# Patient Record
Sex: FEMALE | Race: WHITE | NOT HISPANIC OR LATINO | Employment: UNEMPLOYED | URBAN - METROPOLITAN AREA
[De-identification: names, ages, dates, MRNs, and addresses within clinical notes are randomized per-mention and may not be internally consistent; named-entity substitution may affect disease eponyms.]

---

## 2018-01-04 ENCOUNTER — HOSPITAL ENCOUNTER (OUTPATIENT)
Facility: CLINIC | Age: 38
End: 2018-01-04
Attending: INTERNAL MEDICINE | Admitting: INTERNAL MEDICINE

## 2018-01-04 ENCOUNTER — APPOINTMENT (OUTPATIENT)
Dept: GENERAL RADIOLOGY | Facility: CLINIC | Age: 38
End: 2018-01-04
Attending: EMERGENCY MEDICINE
Payer: COMMERCIAL

## 2018-01-04 ENCOUNTER — HOSPITAL ENCOUNTER (EMERGENCY)
Facility: CLINIC | Age: 38
Discharge: HOME | End: 2018-01-04
Attending: EMERGENCY MEDICINE | Admitting: INTERNAL MEDICINE
Payer: COMMERCIAL

## 2018-01-04 ENCOUNTER — SURGERY (OUTPATIENT)
Age: 38
End: 2018-01-04

## 2018-01-04 VITALS
TEMPERATURE: 98.7 F | DIASTOLIC BLOOD PRESSURE: 55 MMHG | BODY MASS INDEX: 22.07 KG/M2 | OXYGEN SATURATION: 100 % | RESPIRATION RATE: 16 BRPM | HEIGHT: 69 IN | HEART RATE: 78 BPM | SYSTOLIC BLOOD PRESSURE: 97 MMHG | WEIGHT: 149 LBS

## 2018-01-04 DIAGNOSIS — T18.9XXA SWALLOWED FOREIGN BODY, INITIAL ENCOUNTER: ICD-10-CM

## 2018-01-04 LAB — UPPER GI ENDOSCOPY: NORMAL

## 2018-01-04 PROCEDURE — G0500 MOD SEDAT ENDO SERVICE >5YRS: HCPCS | Performed by: INTERNAL MEDICINE

## 2018-01-04 PROCEDURE — 43235 EGD DIAGNOSTIC BRUSH WASH: CPT | Performed by: INTERNAL MEDICINE

## 2018-01-04 PROCEDURE — 71046 X-RAY EXAM CHEST 2 VIEWS: CPT

## 2018-01-04 PROCEDURE — 99285 EMERGENCY DEPT VISIT HI MDM: CPT | Mod: Z6 | Performed by: EMERGENCY MEDICINE

## 2018-01-04 PROCEDURE — 99285 EMERGENCY DEPT VISIT HI MDM: CPT | Mod: 25

## 2018-01-04 PROCEDURE — 25000132 ZZH RX MED GY IP 250 OP 250 PS 637: Performed by: INTERNAL MEDICINE

## 2018-01-04 PROCEDURE — 99153 MOD SED SAME PHYS/QHP EA: CPT | Performed by: INTERNAL MEDICINE

## 2018-01-04 PROCEDURE — 25000128 H RX IP 250 OP 636: Performed by: INTERNAL MEDICINE

## 2018-01-04 PROCEDURE — 25000125 ZZHC RX 250: Performed by: INTERNAL MEDICINE

## 2018-01-04 PROCEDURE — 74018 RADEX ABDOMEN 1 VIEW: CPT

## 2018-01-04 RX ORDER — FENTANYL CITRATE 50 UG/ML
INJECTION, SOLUTION INTRAMUSCULAR; INTRAVENOUS PRN
Status: DISCONTINUED | OUTPATIENT
Start: 2018-01-04 | End: 2018-01-04 | Stop reason: HOSPADM

## 2018-01-04 RX ORDER — SIMETHICONE
LIQUID (ML) MISCELLANEOUS PRN
Status: DISCONTINUED | OUTPATIENT
Start: 2018-01-04 | End: 2018-01-04 | Stop reason: HOSPADM

## 2018-01-04 RX ORDER — DIPHENHYDRAMINE HYDROCHLORIDE 50 MG/ML
INJECTION INTRAMUSCULAR; INTRAVENOUS PRN
Status: DISCONTINUED | OUTPATIENT
Start: 2018-01-04 | End: 2018-01-04 | Stop reason: HOSPADM

## 2018-01-04 RX ADMIN — DIPHENHYDRAMINE HYDROCHLORIDE 50 MG: 50 INJECTION, SOLUTION INTRAMUSCULAR; INTRAVENOUS at 13:48

## 2018-01-04 RX ADMIN — MIDAZOLAM 1 MG: 1 INJECTION INTRAMUSCULAR; INTRAVENOUS at 13:53

## 2018-01-04 RX ADMIN — FENTANYL CITRATE 50 MCG: 50 INJECTION, SOLUTION INTRAMUSCULAR; INTRAVENOUS at 13:54

## 2018-01-04 RX ADMIN — MIDAZOLAM 1 MG: 1 INJECTION INTRAMUSCULAR; INTRAVENOUS at 14:30

## 2018-01-04 RX ADMIN — FENTANYL CITRATE 50 MCG: 50 INJECTION, SOLUTION INTRAMUSCULAR; INTRAVENOUS at 14:32

## 2018-01-04 RX ADMIN — BENZOCAINE 1 SPRAY: 220 SPRAY, METERED PERIODONTAL at 13:49

## 2018-01-04 RX ADMIN — Medication 2 ML: at 13:47

## 2018-01-04 RX ADMIN — MIDAZOLAM 1 MG: 1 INJECTION INTRAMUSCULAR; INTRAVENOUS at 14:10

## 2018-01-04 RX ADMIN — MIDAZOLAM 2 MG: 1 INJECTION INTRAMUSCULAR; INTRAVENOUS at 13:52

## 2018-01-04 RX ADMIN — FENTANYL CITRATE 50 MCG: 50 INJECTION, SOLUTION INTRAMUSCULAR; INTRAVENOUS at 14:14

## 2018-01-04 RX ADMIN — MIDAZOLAM 1 MG: 1 INJECTION INTRAMUSCULAR; INTRAVENOUS at 14:12

## 2018-01-04 RX ADMIN — FENTANYL CITRATE 50 MCG: 50 INJECTION, SOLUTION INTRAMUSCULAR; INTRAVENOUS at 13:50

## 2018-01-04 ASSESSMENT — ENCOUNTER SYMPTOMS
VOMITING: 0
ABDOMINAL PAIN: 0
NECK PAIN: 0
COUGH: 0
FEVER: 0
SHORTNESS OF BREATH: 0
NAUSEA: 0
CHEST TIGHTNESS: 0
NECK STIFFNESS: 0
CHILLS: 0

## 2018-01-04 NOTE — ED AVS SNAPSHOT
[facility], [place], Emergency Department    [address] [place] [address]    Phone:  [phone]                                       Vane Galicia   MRN: [id number]    Department:  [facility], Emergency Department   Date of Visit:  1/4/2018           After Visit Summary Signature Page     I have received my discharge instructions, and my questions have been answered. I have discussed any challenges I see with this plan with the nurse or doctor.    ..........................................................................................................................................  Patient/Patient Representative Signature      ..........................................................................................................................................  Patient Representative Print Name and Relationship to Patient    ..................................................               ................................................  Date                                            Time    ..........................................................................................................................................  Reviewed by Signature/Title    ...................................................              ..............................................  Date                                                            Time

## 2018-01-04 NOTE — PROCEDURES
Gastroenterology Endoscopy Suite Brief Operative Note    Procedure:  Upper endoscopy   Post-operative diagnosis:  Swallowed foreign body; dental implant   Staff Physician:  Dr. Marlyn Colindres   Fellow/Assistant(s):  Dr. Tiffany Jeffers    Specimens:  Please see final procedure note for further details.   Findings:  Dental implant identified in stomach with minor abrasions in gastric body and bulb of duodenum. Removed with maddox net and foreign body ortiz. Second look following foreign body removal without trauma or mucosal damage.     Complications:  None.   Condition:  Stable   Recommendations  Diet:  Return to previous diet  PPI:  N/A  Anti-coagulants/platelets:  N/A  Octreotide:  N/A  Discharge Planning:   Return patient to ED; sign-out provided to Dr. Newton.

## 2018-01-04 NOTE — ED PROVIDER NOTES
Patient cleared appropriately from her sedation.  She is requesting discharge home.  She has a ride and follow-up plan in place.     Rosalina Pereira MD  01/04/18 5237

## 2018-01-04 NOTE — ED AVS SNAPSHOT
[facility], Emergency Department    [address] [address]    Phone:  [phone]                                       Vane Galicia   MRN: [id number]    Department:  [facility], Emergency Department   Date of Visit:  1/4/2018           Patient Information     Date Of Birth          1980        Your diagnoses for this visit were:     Swallowed foreign body, initial encounter        You were seen by Jerad Newton MD and Rosalina Pereira MD.      24 Hour Appointment Hotline       To make an appointment at any [facility] clinic, call [phone] ([phone]). If you don't have a family doctor or clinic, we will help you find one. [facility] clinics are conveniently located to serve the needs of you and your family.             Review of your medicines      Our records show that you are taking the medicines listed below. If these are incorrect, please call your family doctor or clinic.        Dose / Directions Last dose taken    IBUPROFEN PO        Refills:  0                Procedures and tests performed during your visit     XR Abdomen 1 View    XR Chest 2 Views      Orders Needing Specimen Collection     None      Pending Results     No orders found from 1/2/2018 to 1/5/2018.            Pending Culture Results     No orders found from 1/2/2018 to 1/5/2018.            Pending Results Instructions     If you had any lab results that were not finalized at the time of your Discharge, you can call the ED Lab Result RN at [phone]. You will be contacted by this team for any positive Lab results or changes in treatment. The nurses are available 7 days a week from 10A to 6:30P.  You can leave a message 24 hours per day and they will return your call.        Thank you for choosing [facility]       Thank you for choosing [facility] for your care. Our goal is always to provide you with excellent care. Hearing back from our patients is one way we can continue to improve our services.  "Please take a few minutes to complete the written survey that you may receive in the mail after you visit with us. Thank you!        [employer] Information     [employer] lets you send messages to your doctor, view your test results, renew your prescriptions, schedule appointments and more. To sign up, go to [email or web address] . Click on \"Log in\" on the left side of the screen, which will take you to the Welcome page. Then click on \"Sign up Now\" on the right side of the page.     You will be asked to enter the access code listed below, as well as some personal information. Please follow the directions to create your username and password.     Your access code is: [email or web address]  Expires: 2018  5:10 PM     Your access code will  in 90 days. If you need help or a new code, please call your [facility] clinic or [phone].        Care EveryWhere ID     This is your Care EveryWhere ID. This could be used by other organizations to access your [facility] medical records  [id number]        Equal Access to Services     [facility]: Hadsue Gonzalez, waaxda lujasmyne, qaybta kaallibra leigh, lis conklin . So [facility] [phone].    ATENCIÓN: Si habla español, tiene a beard disposición servicios gratuitos de asistencia lingüística. Llame al [phone].    We comply with applicable federal civil rights laws and [place] laws. We do not discriminate on the basis of race, color, national origin, age, disability, sex, sexual orientation, or gender identity.            After Visit Summary       This is your record. Keep this with you and show to your community pharmacist(s) and doctor(s) at your next visit.                  "

## 2018-01-04 NOTE — OR NURSING
EGD done w/ retrieval of dental bridge.  Done under conscious sedation w/ fentanyl and versed and benadryl for comfort. Bridge taken by spouse.  Report called to ED RN

## 2018-01-04 NOTE — ED PROVIDER NOTES
"  History   No chief complaint on file.    HPI  Vane Galicia is a 37 year old female without any past medical history who presents to the Emergency Department [facility] the dental [facility] for evaluation after swallowing a dental bridge. The dental student presented with the patient and helped provide the history. The dental student states that the patient was at the dental school to receive her 3 unit dental bridge. While trying on her dental bridge, about 45 minutes ago, the patient accidentally swallowed the bridge. The patient does not currently have any pain with swallowing or trouble breathing. She also denies abdominal pain.  No fevers.  It is reported that the dental bridge is made out of metal and porcelain.    I have reviewed the Medications, Allergies, Past Medical and Surgical History, and Social History in the Epic system.    Review of Systems   Constitutional: Negative for chills and fever.   Respiratory: Negative for cough, chest tightness and shortness of breath.    Cardiovascular: Negative for chest pain.   Gastrointestinal: Negative for abdominal pain, nausea and vomiting.   Musculoskeletal: Negative for neck pain and neck stiffness.   All other systems reviewed and are negative.      Physical Exam   BP: 124/73  Pulse: 68  Heart Rate: 63  Temp: 97.4  F (36.3  C)  Resp: 16  Height: 175.3 cm (5' 9\")  Weight: 67.6 kg (149 lb)  SpO2: 100 %      Physical Exam   Constitutional: She is oriented to person, place, and time. She appears well-developed and well-nourished. No distress.   HENT:   Head: Normocephalic and atraumatic.   Eyes: Conjunctivae and EOM are normal. No scleral icterus.   Neck: Normal range of motion. Neck supple.   Cardiovascular: Normal rate.    Pulmonary/Chest: Effort normal and breath sounds normal. No respiratory distress. She has no wheezes. She has no rales.   Abdominal: Soft. There is no tenderness.   Musculoskeletal: Normal range of motion.   Neurological: She is alert and " oriented to person, place, and time.   Skin: Skin is warm. No rash noted. She is not diaphoretic.   Psychiatric: She has a normal mood and affect. Her behavior is normal. Judgment and thought content normal.   Nursing note and vitals reviewed.      ED Course   11:14 AM  The patient was seen and examined by Dr. Newton in Room [facility].    ED Course     Procedures             Critical Care time:  none             Labs Ordered and Resulted from Time of ED Arrival Up to the Time of Departure from the ED - No data to display         Assessments & Plan (with Medical Decision Making)   37-year-old female presenting with a swallowed foreign body. Differential diagnosis: esophageal foreign body, esophageal obstruction, esophageal perforation.    After thorough history and physical exam, patient appears to be in no acute distress.  I will obtain chest and abdomen x-ray for further diagnostic evaluation.  Patient agrees with the plan.    I reviewed patient's x-rays and I read the radiology reports; chest x-ray is unremarkable, but her abdominal x-ray shows a foreign body located in mid abdomen.  Her case was discussed with the GI team, , and the plan was made to attempt and retrieved the foreign body via endoscopy.  Patient agreed to this plan and she was transported to the endoscopy suite.    Patient will be signed out to my partner pending return from the endoscopy suite, reassessment, and disposition.    I have reviewed the nursing notes.    I have reviewed the findings, diagnosis, plan and need for follow up with the patient.    New Prescriptions    No medications on file       Final diagnoses:   Swallowed foreign body, initial encounter   German PATINO, am serving as a trained medical scribe to document services personally performed by Jerad Newton MD, based on the provider's statements to me.   Jerad PATINO MD, was physically present and have reviewed and verified the accuracy of this note  documented by German Moore.     1/4/2018   [facility], [place], EMERGENCY DEPARTMENT     Jerad Newton MD  01/04/18 7770

## 2018-01-04 NOTE — ED NOTES
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 No resp distress or discomfort.    VSS

## 2020-08-20 ENCOUNTER — APPOINTMENT (OUTPATIENT)
Dept: URBAN - METROPOLITAN AREA CLINIC 256 | Age: 40
Setting detail: DERMATOLOGY
End: 2020-08-20

## 2020-08-20 VITALS — WEIGHT: 150 LBS | HEIGHT: 69.5 IN

## 2020-08-20 DIAGNOSIS — L30.4 ERYTHEMA INTERTRIGO: ICD-10-CM

## 2020-08-20 PROCEDURE — OTHER COUNSELING: OTHER

## 2020-08-20 PROCEDURE — 99202 OFFICE O/P NEW SF 15 MIN: CPT

## 2020-08-20 PROCEDURE — OTHER PRESCRIPTION: OTHER

## 2020-08-20 PROCEDURE — OTHER ADDITIONAL NOTES: OTHER

## 2020-08-20 RX ORDER — ALCLOMETASONE DIPROPIONATE 0.5 MG/G
0.05% CREAM TOPICAL BID
Qty: 1 | Refills: 0 | Status: ERX | COMMUNITY
Start: 2020-08-20

## 2020-08-20 ASSESSMENT — LOCATION DETAILED DESCRIPTION DERM
LOCATION DETAILED: SUBXIPHOID
LOCATION DETAILED: LEFT MEDIAL BREAST 8-9:00 REGION

## 2020-08-20 ASSESSMENT — LOCATION SIMPLE DESCRIPTION DERM
LOCATION SIMPLE: ABDOMEN
LOCATION SIMPLE: LEFT BREAST

## 2020-08-20 ASSESSMENT — LOCATION ZONE DERM: LOCATION ZONE: TRUNK

## 2020-08-20 NOTE — HPI: RASH
What Type Of Note Output Would You Prefer (Optional)?: Standard Output
Is The Patient Presenting As Previously Scheduled?: Yes
How Severe Is Your Rash?: mild
Is This A New Presentation, Or A Follow-Up?: Rash
Additional History: Patient states that this worsens with sweat and heat.

## 2020-08-20 NOTE — PROCEDURE: ADDITIONAL NOTES
Detail Level: Detailed
Additional Notes: Recommend that she does not use hydrogen peroxide any longer as this can cause further irritation.

## 2021-01-18 ENCOUNTER — APPOINTMENT (OUTPATIENT)
Dept: URBAN - METROPOLITAN AREA CLINIC 256 | Age: 41
Setting detail: DERMATOLOGY
End: 2021-01-25

## 2021-01-18 VITALS — RESPIRATION RATE: 16 BRPM | HEIGHT: 69.5 IN | WEIGHT: 162 LBS

## 2021-01-18 DIAGNOSIS — L95.8 OTHER VASCULITIS LIMITED TO THE SKIN: ICD-10-CM

## 2021-01-18 PROCEDURE — 11104 PUNCH BX SKIN SINGLE LESION: CPT

## 2021-01-18 PROCEDURE — OTHER BIOPSY BY PUNCH METHOD: OTHER

## 2021-01-18 PROCEDURE — OTHER COUNSELING: OTHER

## 2021-01-18 ASSESSMENT — LOCATION SIMPLE DESCRIPTION DERM
LOCATION SIMPLE: RIGHT POSTERIOR THIGH
LOCATION SIMPLE: RIGHT BUTTOCK
LOCATION SIMPLE: RIGHT THIGH

## 2021-01-18 ASSESSMENT — LOCATION ZONE DERM
LOCATION ZONE: TRUNK
LOCATION ZONE: LEG

## 2021-01-18 ASSESSMENT — LOCATION DETAILED DESCRIPTION DERM
LOCATION DETAILED: RIGHT PROXIMAL LATERAL POSTERIOR THIGH
LOCATION DETAILED: RIGHT ANTERIOR PROXIMAL THIGH
LOCATION DETAILED: RIGHT BUTTOCK

## 2021-01-18 NOTE — PROCEDURE: BIOPSY BY PUNCH METHOD
X Size Of Lesion In Cm (Optional): 0
Patient Will Remove Sutures At Home?: No
Hemostasis: Pressure
Information: Selecting Yes will display possible errors in your note based on the variables you have selected. This validation is only offered as a suggestion for you. PLEASE NOTE THAT THE VALIDATION TEXT WILL BE REMOVED WHEN YOU FINALIZE YOUR NOTE. IF YOU WANT TO FAX A PRELIMINARY NOTE YOU WILL NEED TO TOGGLE THIS TO 'NO' IF YOU DO NOT WANT IT IN YOUR FAXED NOTE.
Punch Size In Mm: 4
Render Post-Care Instructions In Note?: yes
Epidermal Sutures: 5-0 Nylon
Detail Level: Detailed
Notification Instructions: Patient will be notified of biopsy results. However, patient instructed to call the office if not contacted within 2 weeks.
Dressing: bandage
Anesthesia Type: 2% lidocaine without epinephrine and a 1:10 solution of 8.4% sodium bicarbonate
Anesthesia Volume In Cc (Will Not Render If 0): 1
Consent: Written consent was obtained and risks were reviewed including but not limited to scarring, infection, bleeding, scabbing, incomplete removal, nerve damage and allergy to anesthesia.
Post-Care Instructions: I reviewed with the patient in detail post-care instructions. Patient is to keep the biopsy site dry overnight, and then apply bacitracin twice daily until healed. Patient may apply hydrogen peroxide soaks to remove any crusting.
Billing Type: Third-Party Bill
Home Suture Removal Text: Patient was provided a home suture removal kit and will remove their sutures at home.  If they have any questions or difficulties they will call the office.
Wound Care: Polysporin ointment
Suture Removal: 7 days
Biopsy Type: H and E

## 2021-01-18 NOTE — PROCEDURE: COUNSELING
Patient Specific Counseling (Will Not Stick From Patient To Patient): -Patient has had these non-blanching purplish spots for about 3 days. Recommend patient wait it out because it most likely will resolve in 2 weeks.\\n-If still present after 2 weeks, RTC for biopsy.
Detail Level: Zone

## 2021-01-25 ENCOUNTER — APPOINTMENT (OUTPATIENT)
Dept: URBAN - METROPOLITAN AREA CLINIC 256 | Age: 41
Setting detail: DERMATOLOGY
End: 2021-01-25

## 2021-01-25 VITALS — RESPIRATION RATE: 16 BRPM | HEIGHT: 69.5 IN | WEIGHT: 162 LBS

## 2021-01-25 DIAGNOSIS — R233 SPONTANEOUS ECCHYMOSES: ICD-10-CM

## 2021-01-25 DIAGNOSIS — Z48.02 ENCOUNTER FOR REMOVAL OF SUTURES: ICD-10-CM

## 2021-01-25 PROBLEM — S70.11XA CONTUSION OF RIGHT THIGH, INITIAL ENCOUNTER: Status: ACTIVE | Noted: 2021-01-25

## 2021-01-25 PROCEDURE — 99212 OFFICE O/P EST SF 10 MIN: CPT

## 2021-01-25 PROCEDURE — OTHER SUTURE REMOVAL (GLOBAL PERIOD): OTHER

## 2021-01-25 PROCEDURE — OTHER COUNSELING: OTHER

## 2021-01-25 ASSESSMENT — LOCATION DETAILED DESCRIPTION DERM: LOCATION DETAILED: RIGHT PROXIMAL LATERAL POSTERIOR THIGH

## 2021-01-25 ASSESSMENT — LOCATION ZONE DERM: LOCATION ZONE: LEG

## 2021-01-25 ASSESSMENT — LOCATION SIMPLE DESCRIPTION DERM: LOCATION SIMPLE: RIGHT POSTERIOR THIGH

## 2021-01-25 NOTE — PROCEDURE: COUNSELING
Detail Level: Simple
Patient Specific Counseling (Will Not Stick From Patient To Patient): -Biopsy results showed basically a bruise per [facility]. This must be from skin brush. The pattern of the rash matches skin brush.

## 2021-01-25 NOTE — PROCEDURE: SUTURE REMOVAL (GLOBAL PERIOD)
Add 19701 Cpt? (Important Note: In 2017 The Use Of 94971 Is Being Tracked By Cms To Determine Future Global Period Reimbursement For Global Periods): no
Detail Level: Detailed

## 2021-12-31 ENCOUNTER — ANCILLARY PROCEDURE (OUTPATIENT)
Dept: MAMMOGRAPHY | Facility: CLINIC | Age: 41
End: 2021-12-31
Payer: COMMERCIAL

## 2021-12-31 DIAGNOSIS — Z12.31 VISIT FOR SCREENING MAMMOGRAM: ICD-10-CM

## 2021-12-31 PROCEDURE — 77063 BREAST TOMOSYNTHESIS BI: CPT | Mod: TC | Performed by: RADIOLOGY

## 2021-12-31 PROCEDURE — 77067 SCR MAMMO BI INCL CAD: CPT | Mod: TC | Performed by: RADIOLOGY

## 2022-01-03 ENCOUNTER — TELEPHONE (OUTPATIENT)
Dept: MAMMOGRAPHY | Facility: CLINIC | Age: 42
End: 2022-01-03

## 2022-01-04 ENCOUNTER — TELEPHONE (OUTPATIENT)
Dept: MAMMOGRAPHY | Facility: CLINIC | Age: 42
End: 2022-01-04

## 2022-01-04 NOTE — TELEPHONE ENCOUNTER
Reason for Call:  Request for results:    Name of test or procedure: Mammogram    Date of test of procedure: 12/31/2021    Location of the test or procedure: [facility]    OK to leave the result message on voice mail or with a family member? YES    Phone number Patient can be reached at:  Cell number on file:    Telephone Information:   Mobile [phone]       Additional comments: Patient is upset that she was never called with results from her mammogram and is requesting for provider to call her with results.    Call taken on 1/3/2022 at 9:01 PM by Lorenza Kay

## 2022-01-04 NOTE — TELEPHONE ENCOUNTER
Contacted patient. Patient states she wants a Radiologist to call her with her results. Patient is not an established patient in [facility] system, she does not have a PCP. Gave patient her results and patient states she would like to be called by the radiologist. Patient states she made the mammogram appointment herself, not under a provider referral and was told someone would call her with results yesterday and she was not called. Discussed with patient to wait until today and if she does not receive a phone call she can call the imaging department. Encouraged patient to establish primary care at a clinic of her choice. Patient states she does not have a PCP and has been seen at the [facility] only.  Patient will call back with concerns or questions.

## 2022-01-04 NOTE — TELEPHONE ENCOUNTER
"Pt calling for mammogram results. Pt is very upset, even after Writer read and explained mammogram results to her more than once. Requesting radiologist call her. States to Writer \"you don't seem to know what you are talking about\".     Pt states \"I don't want to speak to a nurse I want to speak to a doctor\".     Pt declined to let Writer know if her PCP is through [facility].   "

## 2023-01-01 ENCOUNTER — TRANSFERRED RECORDS (OUTPATIENT)
Dept: MULTI SPECIALTY CLINIC | Facility: CLINIC | Age: 43
End: 2023-01-01

## 2023-01-01 LAB — PAP SMEAR - HIM PATIENT REPORTED: NEGATIVE

## 2023-01-12 ENCOUNTER — TRANSFERRED RECORDS (OUTPATIENT)
Dept: HEALTH INFORMATION MANAGEMENT | Facility: CLINIC | Age: 43
End: 2023-01-12

## 2023-01-12 ENCOUNTER — HOSPITAL ENCOUNTER (EMERGENCY)
Facility: CLINIC | Age: 43
Discharge: HOME | End: 2023-01-12
Attending: EMERGENCY MEDICINE | Admitting: EMERGENCY MEDICINE
Payer: COMMERCIAL

## 2023-01-12 VITALS
HEIGHT: 69 IN | DIASTOLIC BLOOD PRESSURE: 83 MMHG | BODY MASS INDEX: 29.49 KG/M2 | HEART RATE: 79 BPM | WEIGHT: 199.1 LBS | SYSTOLIC BLOOD PRESSURE: 138 MMHG | OXYGEN SATURATION: 100 % | RESPIRATION RATE: 20 BRPM | TEMPERATURE: 97.5 F

## 2023-01-12 DIAGNOSIS — F32.A DEPRESSION, UNSPECIFIED DEPRESSION TYPE: ICD-10-CM

## 2023-01-12 DIAGNOSIS — F41.9 ANXIETY DISORDER, UNSPECIFIED TYPE: ICD-10-CM

## 2023-01-12 LAB — SARS-COV-2 RNA RESP QL NAA+PROBE: NEGATIVE

## 2023-01-12 PROCEDURE — 90791 PSYCH DIAGNOSTIC EVALUATION: CPT

## 2023-01-12 PROCEDURE — C9803 HOPD COVID-19 SPEC COLLECT: HCPCS

## 2023-01-12 PROCEDURE — 99284 EMERGENCY DEPT VISIT MOD MDM: CPT | Mod: 25 | Performed by: NURSE PRACTITIONER

## 2023-01-12 PROCEDURE — U0003 INFECTIOUS AGENT DETECTION BY NUCLEIC ACID (DNA OR RNA); SEVERE ACUTE RESPIRATORY SYNDROME CORONAVIRUS 2 (SARS-COV-2) (CORONAVIRUS DISEASE [COVID-19]), AMPLIFIED PROBE TECHNIQUE, MAKING USE OF HIGH THROUGHPUT TECHNOLOGIES AS DESCRIBED BY CMS-2020-01-R: HCPCS | Performed by: EMERGENCY MEDICINE

## 2023-01-12 PROCEDURE — 250N000013 HC RX MED GY IP 250 OP 250 PS 637: Performed by: EMERGENCY MEDICINE

## 2023-01-12 PROCEDURE — 99285 EMERGENCY DEPT VISIT HI MDM: CPT | Mod: 25

## 2023-01-12 RX ORDER — BUPROPION HYDROCHLORIDE 150 MG/1
150 TABLET ORAL DAILY
COMMUNITY
Start: 2022-12-19 | End: 2023-01-12

## 2023-01-12 RX ORDER — ESCITALOPRAM OXALATE 20 MG/1
20 TABLET ORAL AT BEDTIME
COMMUNITY
Start: 2022-12-19 | End: 2023-01-12

## 2023-01-12 RX ORDER — OLANZAPINE 10 MG/1
10 TABLET, ORALLY DISINTEGRATING ORAL
Status: COMPLETED | OUTPATIENT
Start: 2023-01-12 | End: 2023-01-12

## 2023-01-12 RX ORDER — FLUOXETINE 10 MG/1
CAPSULE ORAL
Qty: 53 CAPSULE | Refills: 0 | Status: SHIPPED | OUTPATIENT
Start: 2023-01-12 | End: 2023-01-13

## 2023-01-12 RX ORDER — BUPROPION HYDROCHLORIDE 300 MG/1
300 TABLET ORAL EVERY MORNING
COMMUNITY
Start: 2022-12-19 | End: 2023-01-12

## 2023-01-12 RX ADMIN — OLANZAPINE 10 MG: 10 TABLET, ORALLY DISINTEGRATING ORAL at 14:58

## 2023-01-12 ASSESSMENT — COLUMBIA-SUICIDE SEVERITY RATING SCALE - C-SSRS
5. HAVE YOU STARTED TO WORK OUT OR WORKED OUT THE DETAILS OF HOW TO KILL YOURSELF? DO YOU INTEND TO CARRY OUT THIS PLAN?: YES
LETHALITY/MEDICAL DAMAGE CODE MOST LETHAL POTENTIAL ATTEMPT: BEHAVIOR LIKELY TO RESULT IN INJURY BUT NOT LIKELY TO CAUSE DEATH
4. HAVE YOU HAD THESE THOUGHTS AND HAD SOME INTENTION OF ACTING ON THEM?: YES
REASONS FOR IDEATION LIFETIME: COMPLETELY TO GET ATTENTION, REVENGE, OR A REACTION FROM OTHERS
LETHALITY/MEDICAL DAMAGE CODE FIRST POTENTIAL ATTEMPT: BEHAVIOR LIKELY TO RESULT IN INJURY BUT NOT LIKELY TO CAUSE DEATH
TOTAL  NUMBER OF INTERRUPTED ATTEMPTS PAST 3 MONTHS: 1
REASONS FOR IDEATION PAST MONTH: COMPLETELY TO GET ATTENTION, REVENGE, OR A REACTION FROM OTHERS
TOTAL  NUMBER OF INTERRUPTED ATTEMPTS LIFETIME: YES
1. HAVE YOU WISHED YOU WERE DEAD OR WISHED YOU COULD GO TO SLEEP AND NOT WAKE UP?: YES
LETHALITY/MEDICAL DAMAGE CODE MOST LETHAL ACTUAL ATTEMPT: MODERATE PHYSICAL DAMAGE, MEDICAL ATTENTION NEEDED
3. HAVE YOU BEEN THINKING ABOUT HOW YOU MIGHT KILL YOURSELF?: YES
TOTAL  NUMBER OF ACTUAL ATTEMPTS PAST 3 MONTHS: 1
5. HAVE YOU STARTED TO WORK OUT OR WORKED OUT THE DETAILS OF HOW TO KILL YOURSELF? DO YOU INTEND TO CARRY OUT THIS PLAN?: YES
ATTEMPT PAST THREE MONTHS: YES
LETHALITY/MEDICAL DAMAGE CODE MOST RECENT POTENTIAL ATTEMPT: BEHAVIOR LIKELY TO RESULT IN INJURY BUT NOT LIKELY TO CAUSE DEATH
1. IN THE PAST MONTH, HAVE YOU WISHED YOU WERE DEAD OR WISHED YOU COULD GO TO SLEEP AND NOT WAKE UP?: YES
LETHALITY/MEDICAL DAMAGE CODE MOST RECENT ACTUAL ATTEMPT: MODERATE PHYSICAL DAMAGE, MEDICAL ATTENTION NEEDED
TOTAL  NUMBER OF INTERRUPTED ATTEMPTS PAST 3 MONTHS: YES
ATTEMPT LIFETIME: YES
6. HAVE YOU EVER DONE ANYTHING, STARTED TO DO ANYTHING, OR PREPARED TO DO ANYTHING TO END YOUR LIFE?: NO
TOTAL  NUMBER OF ABORTED OR SELF INTERRUPTED ATTEMPTS LIFETIME: NO
2. HAVE YOU ACTUALLY HAD ANY THOUGHTS OF KILLING YOURSELF?: YES
4. HAVE YOU HAD THESE THOUGHTS AND HAD SOME INTENTION OF ACTING ON THEM?: YES
2. HAVE YOU ACTUALLY HAD ANY THOUGHTS OF KILLING YOURSELF?: YES
MOST LETHAL DATE: 66472
LETHALITY/MEDICAL DAMAGE CODE FIRST ACTUAL ATTEMPT: MODERATE PHYSICAL DAMAGE, MEDICAL ATTENTION NEEDED
MOST RECENT DATE: 66472
FIRST ATTEMPT DATE: 66472
TOTAL  NUMBER OF INTERRUPTED ATTEMPTS LIFETIME: 1
TOTAL  NUMBER OF ACTUAL ATTEMPTS LIFETIME: 1

## 2023-01-12 ASSESSMENT — ACTIVITIES OF DAILY LIVING (ADL)
ADLS_ACUITY_SCORE: 35
ADLS_ACUITY_SCORE: 33
ADLS_ACUITY_SCORE: 35
ADLS_ACUITY_SCORE: 35
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
## 2023-01-12 NOTE — ED PROVIDER NOTES
History     Chief Complaint:  Anxiety       History limited by: psychiatric disorder.      Vane Galicia is a 42 year old female who presents with anxiety. The patient reportedly was on a cruise from 12/24 to 1/7 where she got into an argument with her fiance's son. She then attempted to overdose while on the ship and had a seizure, which caused her to be hospitalized for 3 days following the incident. The patient went on another vacation with her fiance last week and disappeared for 2 hours, eventually stating she had plans to run into traffic. The patient was also prepared to get  to her  on 1/7, but her fiance called it off until the patient received mental health help. She did not receive this well, threatening suicide if they do not get . Additionally, patient has been under numerous life stressors recently including not being allowed to see her children and a divorce. Upon arrival to the ED, the patient was visibly anxious, tearful, and argumentative which resulted in her being put on a hold. Vane currently does not want to share further history without her fiance.     Independent Historian: Corby     Review of External Notes: Yes     Allergies:  No Known Allergies     Medications:    The patient is not currently taking any prescribed medications.    Past Medical History:    The patient denies any significant past medical history.    Past Surgical History:    EGD  Removal of a swallowed tooth    Family History:    Mother: Breast cancer    Social History:  Patient reports that she has never smoked. She has never used smokeless tobacco. She reports that she does not drink alcohol.  Patient arrives through a private vehicle driven by her fiance    Physical Exam     Patient Vitals for the past 24 hrs:   BP Temp Temp src Pulse Resp SpO2   01/12/23 1247 139/80 97.5  F (36.4  C) Temporal 87 20 100 %      Physical Exam  Vitals: reviewed by me  General: Pt seen pacing back in the  behavioral health hallway, quite agitated, raised voice, very aggressive though redirectable and nonphysical.  Demanding that her fiancé be let back in before she speaks to anybody.  Eyes: Tracking well, clear conjunctiva BL  ENT: MMM, midline trachea.   Lungs: No tachypnea, no accessory muscle use. No respiratory distress.   MSK: no joint effusion.  No evidence of trauma  Skin: No rash  Neuro: Clear speech and no facial droop.  Psych: Not RIS, no e/o AH/VH, very agitated however    Emergency Department Course   Laboratory:  Labs Ordered and Resulted from Time of ED Arrival to Time of ED Departure   COVID-19 VIRUS (CORONAVIRUS) BY PCR - Normal       Result Value    SARS CoV2 PCR Negative          Emergency Department Course & Assessments:      Interventions:  1323 I obtained history and examined the patient as noted above  1458 Zyprexa 10 mg PO   1517 I reassessed the patient, who is compliant to be sent to [facility] at this time.     Social Determinants of Health affecting care:  Caring fiance     Disposition:  The patient was transferred to [facility].     Impression & Plan    Medical Decision Making:  This is a 42-year-old female who presents emergency room with anxiety and mental health issues.  Eventually her  was able to come back and the patient was quite calm and she even took a Zyprexa and allowed for COVID swab.  She has no medical complaints at this time, does have a history of mental health issues, and it sounds like the fiancé recently said he is holding off on marrying her until she gets a mental health evaluation.  This does seem to be a trigger, but the patient is medically cleared at this time as she is now calm and cooperative after being in the ER for over 3-1/2 hours.  I do think that she stable to be seen in [facility], COVID test is negative, normal vital signs, will plan for transfer as above.  Diagnosis:    ICD-10-CM    1. Anxiety disorder, unspecified type  F41.9       2. Mental health-related  complaint  Z71.1            Scribe Disclosure:  I, Hermes Jose, am serving as a scribe at 2:24 PM on 1/12/2023 to document services personally performed by Gino Mitchell MD based on my observations and the provider's statements to me.     I, Vicki Azar, am serving as a scribe  at 4:10 PM on 1/12/2023 to document services personally performed by Gino Mitchell MD based on my observations and the provider's statements to me.     1/12/2023   Gino Mitchell MD Fitzgerald, Michael Maxwell Kreofsky, MD  01/12/23 0174

## 2023-01-12 NOTE — PROGRESS NOTES
"42 year old female with history of adjustment disorder and depression received from ED due to Anxiety and depression. Reports lack of energy, fatigue, hard time concentrating and suicide attempt via overdose of Wellbutrin 12/29 resulting in hospitalization and seizure.. Denies current SI/HI but states she will try again if her boyfriend does not agree to  her. Nursing and risk assessments completed. Assessments reviewed with LMHP and physician. Admission information reviewed with patient. Patient given a tour of [facility] and instructions on using the facility. Questions regarding [facility] addressed. Pt safety search completed.         Patient attempted to overdose on Welbutrin on 12/29 after her boyfriend canceled their wedding until she received mental health help. It was reported that she also ran into traffic last week after an argument with her boyfriend. She denies current SI/SIB and HI but does say that if her boyfriend does not agree to  her she will \"Just do it again\". She States she has not taken any medication since her overdose attempt on 12/29.  Her mood is sad, depressed and she is guarded at times. When asked about past suicide attempts she states \"That is none of your business\" but then does open up to some extent. Affect is teary, tense, irritable. Denies AH/VH.  When asked if anyone is abusing her physically, sexually, verbally or financially she hesitantly states no. Then when asked if she feels safe where she is living she answered by saying \"Well I have no place else to go.\"        She is concerned about leaving here in time to meet with her professor tomorrow at 10:30a so she can withdraw from class, as she can not concentrate enough to keep up, before any financial  penalty's apply.  "

## 2023-01-13 RX ORDER — FLUOXETINE 10 MG/1
CAPSULE ORAL
Qty: 53 CAPSULE | Refills: 0 | Status: SHIPPED | OUTPATIENT
Start: 2023-01-13 | End: 2023-02-21

## 2023-01-13 NOTE — PROGRESS NOTES
Patient agrees to discharge plan. Discharge instructions reviewed with patient including follow-up care plan. Medications: Prozac reviewed and questions answered. Medication sent to her home pharmacy . Reviewed safety plan and outpatient resources. Denies SI and HI. All belongings that were brought into the hospital have been returned to patient. Escorted off the unit  accompanied by [facility] staff. Discharged to home via private car with SO

## 2023-01-13 NOTE — ED PROVIDER NOTES
[facility] Unit - Psychiatric Consultation  [facility] Emergency Department    Vane Galicia MRN: [id number]   Age: 42 year old YOB: 1980     History     Chief Complaint   Patient presents with     Anxiety     Telemedicine Visit: The patient's condition can be safely assessed and treated via synchronous audio and visual telemedicine encounter.      Reason for Telemedicine Visit: Services only offered telehealth      Originating Site (Patient Location): [facility] emergency department unit    Distant Site (Provider Location): Provider [facility] Setting-[place], [place]    Consent:  The patient/guardian has verbally consented to: the potential risks and benefits of telemedicine (video visit or phone) versus in person care; bill my insurance or make self-payment for services provided; and responsibility for payment of non-covered services.     Mode of Communication: [employer], a secure HIPAA compliant video platform        HPI  Vane Galicia is a 42 year old female with history notable for anxiety and depression who presents to the ED with mental health concerns in the context of relationship discord. Patient was emotionally dysregulated in the ED and treated with Zyprexa 10 mg PO for heightened anxiety and verbal agitation. Patient was evaluated by the ED provider, who medically cleared patient to transfer to [facility] for psychiatric assessment, this is reviewed along with all pertinent labs and tests performed.     On examination, patient is calm and cooperative as the effects of Zyprexa appear effective.  Patient is worried about the status of her relationship with her boyfriend. They are planning to get . They were on a cruise together last month. There was some tension between her and her boyfriends 11-year-old son. Patient apparently overdosed on her wellbutrin and seized. She was treated at the [facility]. She no longer takes Wellbutrin. She abruptly stopped taking her Lexapro about  "2 weeks ago. She is experiencing difficulty with memory, concentration, worsening depression, low energy and irritability.  Her boyfriend has concerns for her mental health and encouraged her to seek help and restart medications. Patient denies any suicidal thoughts. She is agreeable with starting medications to target anxiety and depression. She is seeking to return home after assessment as she has regained control of her emotions.    Past Medical History  No past medical history on file.  Past Surgical History:   Procedure Laterality Date     ESOPHAGOSCOPY, GASTROSCOPY, DUODENOSCOPY (EGD), COMBINED N/A 1/4/2018    Procedure: COMBINED ESOPHAGOSCOPY, GASTROSCOPY, DUODENOSCOPY (EGD);  EGD foreign body;  Surgeon: Marlyn Colindres MD;  Location:  GI     FLUoxetine (PROZAC) 10 MG capsule      No Known Allergies  Family History  No family history on file.  Social History   Social History     Tobacco Use     Smoking status: Never     Smokeless tobacco: Never   Substance Use Topics     Alcohol use: No          Review of Systems  A medically appropriate review of systems was performed with pertinent positives and negatives noted in the HPI, and all other systems negative.    Physical Examination   BP: 139/80  Pulse: 87  Temp: 97.5  F (36.4  C)  Resp: 20  Height: 175.3 cm (5' 9\")  Weight: 90.3 kg (199 lb 1.6 oz)  SpO2: 100 %    Physical Exam  General: Appears stated age.   Neuro: Alert and fully oriented. Extremities appear to demonstrate normal strength on visual inspection.   Integumentary/Skin: no rash visualized, normal color    Psychiatric Examination   Appearance: fatigued, alert and cooperative  Attitude:  cooperative  Eye Contact:  good  Mood:  sad  and depressed  Affect:  intensity is blunted  Speech:  clear, coherent and [place] accent  Psychomotor Behavior:  no evidence of tardive dyskinesia, dystonia, or tics  Thought Process:  logical, linear and goal oriented  Associations:  no loose " associations  Thought Content:  no evidence of suicidal ideation or homicidal ideation and no evidence of psychotic thought  Insight:  good  Judgement:  intact  Oriented to:  time, person, and place  Attention Span and Concentration:  fair  Recent and Remote Memory:  fair  Language: able to name/identify objects without impairment  Fund of Knowledge: intact with awareness of current and past events    ED Course        Labs Ordered and Resulted from Time of ED Arrival to Time of ED Departure   COVID-19 VIRUS (CORONAVIRUS) BY PCR - Normal       Result Value    SARS CoV2 PCR Negative         Assessments & Plan (with Medical Decision Making)   Patient presenting with mental health concerns in the context of relationship discord further complicated by impulsive overdose and stopping medications.     Nursing notes reviewed noting no acute issues.     I have reviewed the assessment completed by the [facility].     After a period of working with the treatment team on the [facility] unit, the patient's mental state improved to allow a safe transition to outpatient care. After counseling on the diagnosis, work-up, and treatment plan, the patient was discharged. Close follow-up with a psychiatrist and/or therapist was recommended and community psychiatric resources were provided. Patient is to return to the ED if any urgent or potentially life-threatening concerns.     At the time of discharge, the patient's acute suicide risk was determined to be low due to the following factors: Reduction in the intensity of mood/anxiety symptoms that preceded the admission, denial of suicidal thoughts, denies feeling helpless or helpless, not currently under the influence of alcohol or illicit substances, denies experiencing command hallucinations, no immediate access to firearms. The patient's acute risk could be higher if noncompliant with their treatment plan, medications, follow-up appointments or using illicit substances or alcohol. Protective  factors include: social supports, children, stable housing.    Preliminary diagnosis:    ICD-10-CM    1. Anxiety disorder, unspecified type  F41.9       2. Depression, unspecified depression type  F32.A            Treatment Plan:  -Discharge home with safety plan.  -Start Prozac 10 mg daily for 7 days and then increase to 20 mg daily to target anxiety and depression.  -Stop Wellbutrin and Lexapro.  -Medication education provided this visit includes, rationale for medication, importance of compliance, medication interactions, and common side effects. Patient agreeable.  -Referral for psychiatry.  -Follow up with established therapist.  -Supportive psychotherapy provided.    --  BRITNEY Mcmahon CNP   [facility] EMERGENCY DEPT  [facility] Unit  1/12/2023      Ashlyn Ryan APRN CNP  01/12/23 2052

## 2023-01-13 NOTE — DISCHARGE INSTRUCTIONS
Aftercare Plan    Please visit [email or web address] to find Primary Care Providers in network. Follow up with established providers and supports as scheduled. Continue taking medications as prescribed. Abstain from drugs and alcohol. Utilize your [facility] mental health crisis team as needed. They are available 24/7. Contact information is listed below.     The following appointment(s) and/or referral(s) were made on your behalf. If you need to make changes or cancel please contact the clinic/provider directly.     What: Psychiatry and Medication Management (Virtual)  When: Thursday, 1/19/2023  Time: 11:00 am - 12:00 pm  Who: Shell Abreu, MSN, CNP  Where: This appointment is virtual. Please check your email for instructions.   Contact: [facility], [facility] [address] [address]  Phone: [phone]          If I am feeling unsafe or I am in a crisis, I will:   Contact my established care providers   Call the National Suicide Prevention Lifeline: 691.726.2320   Go to the nearest emergency room   Call 911     Warning signs that I or other people might notice when a crisis is developing for me: changes to sleep, appetite or mood, increased anger, agitation or irritability, feeling depressed or hopeless, spending more time alone or talking less, increased crying, decreased productivity, seeing or hearing things that aren't there, thoughts of not wanting to live anymore or of actually killing myself, thoughts of hurting others    Things I am able to do on my own to cope or help me feel better: watching a favorite tv show or movie, listening to music I enjoy, going outside and breathing fresh air, going for a walk or exercising, taking a shower or bath, a cold or hot beverage, a healthy snack, drawing/coloring/painting, journaling, singing or dancing, deep breathing     I can try practicing square breathing when I begin to feel anxious - inhale through the nose for the count of 4 and  the first line on the square. Exhale through the mouth for the count of 4 for the second line of the square. Repeat to complete the square. Repeat the square as many times as needed.    I can also use my five senses to practice mindfulness and grounding. What are five things I can see, four things I can hear, three things I can feel, two things I can smell, and one thing I can taste.     Things that I am able to do with others to cope or help me feel better: sometimes just talking or spending time with someone else, sharing a meal or having coffee, watching a movie or playing a game, going for a walk or exercising    I can also use community resources including mental health hotlines, [facility] crisis teams, or apps.     Things I can use or do for distraction: movies/tv, music, reading, games, drawing/coloring/painting or other art, essential oils, exercise, cleaning/organizing, puzzles, crossword puzzles, word search, Sudoku       I can also download a meditation or relaxation robert, like Calm, Headspace, or Insight Timer (all three offer a free version)    Changes I can make to support my mental health and wellness: Attend scheduled mental health therapy and psychiatric appointments. Take my medications as prescribed. Maintain a daily schedule/routine. Abstain from all mood altering substances, including drugs, alcohol, or medications not currently prescribed to me. Implement a self-care routine.      People in my life that I can ask for help: friends or family, trusted teachers/staff/colleagues, trusted members of my community or place of Taoism, mental health crisis lines, or 911    Your [facility] has a mental health crisis team you can call 24/7: [facility], [phone]    Other things that are important when I m in crisis: to remember that the feelings I am having right now are temporary, and it won't feel like this forever, and that it is okay and important to ask for help    Crisis Lines  Crisis Text  "Line  Text 270846  You will be connected with a trained live crisis counselor to provide support.    Por joaquín, claytono  NEHA a 405573 o texto a 442-AYUDAME en WhatsApp    National Hope Line  1.800.SUICIDE [5161594]      Community Resources  Fast Tracker  Linking people to mental health and substance use disorder resources  fasttrackBISciencen.org     [facility] Warm Line  Peer to peer support  Monday thru Saturday, 12 pm to 10 pm  249.238.5923 or 3.556.105.7641  Text \"Support\" to 89589    National [place] on Mental Illness (BRYAN)  078.306.7845 or 1.888.BRYAN.HELPS      Mental Health Apps  My3  https://[employer].org/    VirtualHopeBox  https://Rollerscoot/apps/virtual-hope-box/      Additional Information  Today you were seen by a licensed mental health professional through Triage and Transition services, Behavioral Healthcare Providers ([facility])  for a crisis assessment in the Emergency Department at [facility].  It is recommended that you follow up with your established providers (psychiatrist, mental health therapist, and/or primary care doctor - as relevant) as soon as possible. Coordinators from [facility] will be calling you in the next 24-48 hours to ensure that you have the resources you need.  You can also contact [facility] coordinators directly at [phone]. You may have been scheduled for or offered an appointment with a mental health provider. [facility] maintains an extensive network of licensed behavioral health providers to connect patients with the services they need.  We do not charge providers a fee to participate in our referral network.  We match patients with providers based on a patient's specific needs, insurance coverage, and location.  Our first effort will be to refer you to a provider within your care system, and will utilize providers outside your care system as needed.            "

## 2023-02-15 ENCOUNTER — ANCILLARY PROCEDURE (OUTPATIENT)
Dept: MAMMOGRAPHY | Facility: CLINIC | Age: 43
End: 2023-02-15
Payer: COMMERCIAL

## 2023-02-15 DIAGNOSIS — Z12.31 SCREENING MAMMOGRAM FOR BREAST CANCER: ICD-10-CM

## 2023-02-15 PROCEDURE — 77067 SCR MAMMO BI INCL CAD: CPT | Mod: TC | Performed by: RADIOLOGY

## 2023-02-15 PROCEDURE — 77063 BREAST TOMOSYNTHESIS BI: CPT | Mod: TC | Performed by: RADIOLOGY

## 2023-02-21 ENCOUNTER — HOSPITAL ENCOUNTER (OUTPATIENT)
Dept: MAMMOGRAPHY | Facility: CLINIC | Age: 43
Discharge: HOME | End: 2023-02-21
Attending: INTERNAL MEDICINE
Payer: COMMERCIAL

## 2023-02-21 ENCOUNTER — OFFICE VISIT (OUTPATIENT)
Dept: FAMILY MEDICINE | Facility: CLINIC | Age: 43
End: 2023-02-21
Payer: COMMERCIAL

## 2023-02-21 VITALS
HEIGHT: 69 IN | BODY MASS INDEX: 29.7 KG/M2 | HEART RATE: 76 BPM | DIASTOLIC BLOOD PRESSURE: 75 MMHG | RESPIRATION RATE: 20 BRPM | WEIGHT: 200.5 LBS | TEMPERATURE: 97.5 F | OXYGEN SATURATION: 96 % | SYSTOLIC BLOOD PRESSURE: 117 MMHG

## 2023-02-21 DIAGNOSIS — D25.9 UTERINE LEIOMYOMA, UNSPECIFIED LOCATION: ICD-10-CM

## 2023-02-21 DIAGNOSIS — R92.8 ABNORMAL MAMMOGRAM: ICD-10-CM

## 2023-02-21 DIAGNOSIS — Z23 HIGH PRIORITY FOR 2019-NCOV VACCINE: ICD-10-CM

## 2023-02-21 DIAGNOSIS — F33.1 MODERATE EPISODE OF RECURRENT MAJOR DEPRESSIVE DISORDER (H): ICD-10-CM

## 2023-02-21 DIAGNOSIS — R06.02 SOB (SHORTNESS OF BREATH): ICD-10-CM

## 2023-02-21 DIAGNOSIS — Z80.3 FAMILY HISTORY OF MALIGNANT NEOPLASM OF BREAST: ICD-10-CM

## 2023-02-21 DIAGNOSIS — N63.20 MASS OF LEFT BREAST, UNSPECIFIED QUADRANT: ICD-10-CM

## 2023-02-21 DIAGNOSIS — Z00.00 ANNUAL PHYSICAL EXAM: Primary | ICD-10-CM

## 2023-02-21 DIAGNOSIS — F41.1 GENERALIZED ANXIETY DISORDER: ICD-10-CM

## 2023-02-21 LAB
ANION GAP SERPL CALCULATED.3IONS-SCNC: 11 MMOL/L (ref 7–15)
BUN SERPL-MCNC: 14.5 MG/DL (ref 6–20)
CALCIUM SERPL-MCNC: 9 MG/DL (ref 8.6–10)
CHLORIDE SERPL-SCNC: 106 MMOL/L (ref 98–107)
CHOLEST SERPL-MCNC: 168 MG/DL
CREAT SERPL-MCNC: 0.84 MG/DL (ref 0.51–0.95)
DEPRECATED HCO3 PLAS-SCNC: 23 MMOL/L (ref 22–29)
ERYTHROCYTE [DISTWIDTH] IN BLOOD BY AUTOMATED COUNT: 14.4 % (ref 10–15)
FERRITIN SERPL-MCNC: 17 NG/ML (ref 6–175)
GFR SERPL CREATININE-BSD FRML MDRD: 87 ML/MIN/1.73M2
GLUCOSE SERPL-MCNC: 93 MG/DL (ref 70–99)
HCT VFR BLD AUTO: 39.8 % (ref 35–47)
HDLC SERPL-MCNC: 54 MG/DL
HGB BLD-MCNC: 12.7 G/DL (ref 11.7–15.7)
IRON BINDING CAPACITY (ROCHE): 351 UG/DL (ref 240–430)
IRON SATN MFR SERPL: 22 % (ref 15–46)
IRON SERPL-MCNC: 77 UG/DL (ref 37–145)
LDLC SERPL CALC-MCNC: 75 MG/DL
MCH RBC QN AUTO: 27.6 PG (ref 26.5–33)
MCHC RBC AUTO-ENTMCNC: 31.9 G/DL (ref 31.5–36.5)
MCV RBC AUTO: 87 FL (ref 78–100)
NONHDLC SERPL-MCNC: 114 MG/DL
PLATELET # BLD AUTO: 336 10E3/UL (ref 150–450)
POTASSIUM SERPL-SCNC: 4.1 MMOL/L (ref 3.4–5.3)
RBC # BLD AUTO: 4.6 10E6/UL (ref 3.8–5.2)
SODIUM SERPL-SCNC: 140 MMOL/L (ref 136–145)
TRIGL SERPL-MCNC: 196 MG/DL
TSH SERPL DL<=0.005 MIU/L-ACNC: 1.47 UIU/ML (ref 0.3–4.2)
WBC # BLD AUTO: 6.6 10E3/UL (ref 4–11)

## 2023-02-21 PROCEDURE — 84443 ASSAY THYROID STIM HORMONE: CPT | Performed by: INTERNAL MEDICINE

## 2023-02-21 PROCEDURE — 99214 OFFICE O/P EST MOD 30 MIN: CPT | Mod: 25 | Performed by: INTERNAL MEDICINE

## 2023-02-21 PROCEDURE — 83550 IRON BINDING TEST: CPT | Performed by: INTERNAL MEDICINE

## 2023-02-21 PROCEDURE — 91313 COVID-19 VACCINE BIVALENT BOOSTER 18+ (MODERNA): CPT | Performed by: INTERNAL MEDICINE

## 2023-02-21 PROCEDURE — 96127 BRIEF EMOTIONAL/BEHAV ASSMT: CPT | Performed by: INTERNAL MEDICINE

## 2023-02-21 PROCEDURE — 76642 ULTRASOUND BREAST LIMITED: CPT | Mod: LT

## 2023-02-21 PROCEDURE — 83540 ASSAY OF IRON: CPT | Performed by: INTERNAL MEDICINE

## 2023-02-21 PROCEDURE — 80061 LIPID PANEL: CPT | Performed by: INTERNAL MEDICINE

## 2023-02-21 PROCEDURE — 36415 COLL VENOUS BLD VENIPUNCTURE: CPT | Performed by: INTERNAL MEDICINE

## 2023-02-21 PROCEDURE — 0134A COVID-19 VACCINE BIVALENT BOOSTER 18+ (MODERNA): CPT | Performed by: INTERNAL MEDICINE

## 2023-02-21 PROCEDURE — 99396 PREV VISIT EST AGE 40-64: CPT | Mod: 25 | Performed by: INTERNAL MEDICINE

## 2023-02-21 PROCEDURE — 82728 ASSAY OF FERRITIN: CPT | Performed by: INTERNAL MEDICINE

## 2023-02-21 PROCEDURE — 80048 BASIC METABOLIC PNL TOTAL CA: CPT | Performed by: INTERNAL MEDICINE

## 2023-02-21 PROCEDURE — 85027 COMPLETE CBC AUTOMATED: CPT | Performed by: INTERNAL MEDICINE

## 2023-02-21 RX ORDER — HYDROXYZINE HYDROCHLORIDE 10 MG/1
10 TABLET, FILM COATED ORAL DAILY PRN
COMMUNITY
End: 2024-06-12

## 2023-02-21 RX ORDER — FLUOXETINE 10 MG/1
CAPSULE ORAL
Qty: 53 CAPSULE | Refills: 0 | COMMUNITY
Start: 2023-02-21 | End: 2024-06-12

## 2023-02-21 ASSESSMENT — PAIN SCALES - GENERAL: PAINLEVEL: NO PAIN (0)

## 2023-02-21 ASSESSMENT — ENCOUNTER SYMPTOMS
COUGH: 0
SORE THROAT: 0
NERVOUS/ANXIOUS: 1
NAUSEA: 0
WEAKNESS: 1
MYALGIAS: 0
BREAST MASS: 0
ABDOMINAL PAIN: 1
DIARRHEA: 0
PALPITATIONS: 0
DYSURIA: 0
HEADACHES: 1
FREQUENCY: 1
HEARTBURN: 0
SHORTNESS OF BREATH: 1
HEMATURIA: 0
CHILLS: 0
CONSTIPATION: 0
DIZZINESS: 0
HEMATOCHEZIA: 0
FEVER: 0
EYE PAIN: 0
PARESTHESIAS: 0
ARTHRALGIAS: 0

## 2023-02-21 ASSESSMENT — PATIENT HEALTH QUESTIONNAIRE - PHQ9
SUM OF ALL RESPONSES TO PHQ QUESTIONS 1-9: 14
10. IF YOU CHECKED OFF ANY PROBLEMS, HOW DIFFICULT HAVE THESE PROBLEMS MADE IT FOR YOU TO DO YOUR WORK, TAKE CARE OF THINGS AT HOME, OR GET ALONG WITH OTHER PEOPLE: VERY DIFFICULT
SUM OF ALL RESPONSES TO PHQ QUESTIONS 1-9: 14

## 2023-02-21 NOTE — PATIENT INSTRUCTIONS
Please call to schedule the echocardiogram [phone]  They are located in [address] on the [facility]

## 2023-02-21 NOTE — PROGRESS NOTES
SUBJECTIVE:   CC: Vane is an 42 year old who presents for preventive health visit.   Patient has been advised of split billing requirements and indicates understanding: Yes  Healthy Habits:     Getting at least 3 servings of Calcium per day:  NO    Bi-annual eye exam:  Yes    Dental care twice a year:  Yes    Sleep apnea or symptoms of sleep apnea:  None    Diet:  Gluten-free/reduced    Frequency of exercise:  1 day/week    Duration of exercise:  Less than 15 minutes    Taking medications regularly:  Yes    Medication side effects:  Other    PHQ-2 Total Score: 4    Additional concerns today:  Yes      Vane is a very pleasant 42 year old who presented to the clinic for APE. She is new to our clinic.     She was recently seen by Ob for uterine fibroids and heavy menstrual bleeding.   She reports shortness of breath on exertion and low energy for last few months.   She is on prozac 60 mg daily for depression.  She was recently found to have a mass on screening mammogram and is scheduled for diagnostic and ultrasound today.   She has family hx of breast cancer in mother, diagnosed at age 55.   Up to date with pap.     Today's PHQ-2 Score:   PHQ-2 ( 1999 Pf[employer]) 2/21/2023   Q1: Little interest or pleasure in doing things 2   Q2: Feeling down, depressed or hopeless 2   PHQ-2 Score 4   Q1: Little interest or pleasure in doing things More than half the days   Q2: Feeling down, depressed or hopeless More than half the days   PHQ-2 Score 4       Have you ever done Advance Care Planning? (For example, a Health Directive, POLST, or a discussion with a medical provider or your loved ones about your wishes): No, advance care planning information given to patient to review.  Patient declined advance care planning discussion at this time.    Social History     Tobacco Use     Smoking status: Never     Smokeless tobacco: Never   Substance Use Topics     Alcohol use: No     If you drink alcohol do you typically have >3  drinks per day or >7 drinks per week? No    Alcohol Use 2/21/2023   Prescreen: >3 drinks/day or >7 drinks/week? No   Prescreen: >3 drinks/day or >7 drinks/week? -   No flowsheet data found.    Reviewed orders with patient.  Reviewed health maintenance and updated orders accordingly - Yes  Lab work is in process    Breast Cancer Screening:  Any new diagnosis of family breast, ovarian, or bowel cancer? Yes     FHS-7:   Breast CA Risk Assessment (FHS-7) 12/31/2021 12/31/2021 2/15/2023 2/15/2023 2/21/2023 2/21/2023   Did any of your first-degree relatives have breast or ovarian cancer? No Yes Yes Yes Yes Yes   Did any of your relatives have bilateral breast cancer? No - No No Yes No   Did any man in your family have breast cancer? No - No No No No   Did any woman in your family have breast and ovarian cancer? No - No No No No   Did any woman in your family have breast cancer before age 50 y? No - No No No No   Do you have 2 or more relatives with breast and/or ovarian cancer? No - - No No No   Do you have 2 or more relatives with breast and/or bowel cancer? No - No No No No     Mammogram Screening - Offered annual screening and updated Health Maintenance for mutual plan based on risk factor consideration    Pertinent mammograms are reviewed under the imaging tab.    History of abnormal Pap smear: NO - age 30-65 PAP every 5 years with negative HPV co-testing recommended       Review of Systems   Constitutional: Negative for chills and fever.   HENT: Negative for congestion, ear pain, hearing loss and sore throat.    Eyes: Positive for visual disturbance. Negative for pain.   Respiratory: Positive for shortness of breath. Negative for cough.    Cardiovascular: Negative for chest pain, palpitations and peripheral edema.   Gastrointestinal: Positive for abdominal pain. Negative for constipation, diarrhea, heartburn, hematochezia and nausea.   Breasts:  Positive for tenderness. Negative for breast mass and discharge.  "  Genitourinary: Positive for frequency, urgency, vaginal bleeding and vaginal discharge. Negative for dysuria, genital sores and hematuria.   Musculoskeletal: Negative for arthralgias and myalgias.   Skin: Negative for rash.   Neurological: Positive for weakness and headaches. Negative for dizziness and paresthesias.   Psychiatric/Behavioral: Positive for mood changes. The patient is nervous/anxious.         OBJECTIVE:   /75 (BP Location: Left arm, Patient Position: Sitting, Cuff Size: Adult Large)   Pulse 76   Temp 97.5  F (36.4  C) (Oral)   Resp 20   Ht 1.76 m (5' 9.29\")   Wt 90.9 kg (200 lb 8 oz)   LMP 02/20/2023   SpO2 96%   Breastfeeding No   BMI 29.36 kg/m    Physical Exam  Vitals reviewed.   Constitutional:       Appearance: Normal appearance.   HENT:      Right Ear: Tympanic membrane normal. There is no impacted cerumen.      Left Ear: Tympanic membrane normal. There is no impacted cerumen.      Mouth/Throat:      Mouth: Mucous membranes are moist.      Pharynx: Oropharynx is clear. No oropharyngeal exudate or posterior oropharyngeal erythema.   Cardiovascular:      Rate and Rhythm: Normal rate and regular rhythm.      Heart sounds: Normal heart sounds. No murmur heard.    No gallop.   Pulmonary:      Effort: Pulmonary effort is normal. No respiratory distress.      Breath sounds: Normal breath sounds. No stridor. No wheezing, rhonchi or rales.   Abdominal:      General: Abdomen is flat. There is no distension.      Palpations: Abdomen is soft. There is no mass.      Tenderness: There is no abdominal tenderness. There is no guarding.      Hernia: No hernia is present.   Musculoskeletal:         General: Normal range of motion.      Cervical back: Normal range of motion and neck supple. No rigidity or tenderness.      Right lower leg: No edema.      Left lower leg: No edema.   Lymphadenopathy:      Cervical: No cervical adenopathy.   Skin:     General: Skin is warm and dry.   Neurological:     " " General: No focal deficit present.      Mental Status: She is alert.   Psychiatric:         Mood and Affect: Mood normal.       ASSESSMENT/PLAN:   Vane was seen today for physical, shortness of breath, fatigue, radiology visit, gyn exam, lab request and imm/inj.    Diagnoses and all orders for this visit:    Annual physical exam  -     CBC with Platelets (Today); Future  -     Basic metabolic panel; Future  -     Lipid Profile; Future  -     TSH with free T4 reflex; Future    Mass of left breast, unspecified quadrant  Ultrasound shows mass is a cyst - benign     Family history of malignant neoplasm of breast  In mother. Discussed self breast exam.    Generalized anxiety disorder  Stable. Continue prozac.    Moderate episode of recurrent major depressive disorder (H)  Stable. Continue prozac.     Uterine leiomyoma, unspecified location  -     Ferritin; Future  -     IRON AND IRON BINDING CAPACITY; Future  -     Ferritin  -     IRON AND IRON BINDING CAPACITY    SOB (shortness of breath)  Most likely from iron deficiency anemia from heavy menstrual bleeding.   Check echo  -     Echocardiogram Complete; Future    High priority for 2019-nCoV vaccine  covid booster.    Other orders  -     REVIEW OF HEALTH MAINTENANCE PROTOCOL ORDERS  -     COVID-19,PF,MODERNA BIVALENT 18+Yrs        Patient has been advised of split billing requirements and indicates understanding: Yes      COUNSELING:  Reviewed preventive health counseling, as reflected in patient instructions       Healthy diet/nutrition    BMI:   Estimated body mass index is 29.36 kg/m  as calculated from the following:    Height as of this encounter: 1.76 m (5' 9.29\").    Weight as of this encounter: 90.9 kg (200 lb 8 oz).   Weight management plan: Discussed healthy diet and exercise guidelines      She reports that she has never smoked. She has never used smokeless tobacco.    HELIO BARTON MD  [facility]  "

## 2023-03-07 ENCOUNTER — HOSPITAL ENCOUNTER (OUTPATIENT)
Dept: CARDIOLOGY | Facility: CLINIC | Age: 43
Discharge: HOME | End: 2023-03-07
Attending: INTERNAL MEDICINE | Admitting: INTERNAL MEDICINE
Payer: COMMERCIAL

## 2023-03-07 DIAGNOSIS — R06.02 SOB (SHORTNESS OF BREATH): ICD-10-CM

## 2023-03-07 LAB — LVEF ECHO: NORMAL

## 2023-03-07 PROCEDURE — 93306 TTE W/DOPPLER COMPLETE: CPT | Mod: 26 | Performed by: INTERNAL MEDICINE

## 2023-03-07 PROCEDURE — 93306 TTE W/DOPPLER COMPLETE: CPT

## 2023-05-01 ENCOUNTER — OFFICE VISIT (OUTPATIENT)
Dept: FAMILY MEDICINE | Facility: CLINIC | Age: 43
End: 2023-05-01
Payer: COMMERCIAL

## 2023-05-01 VITALS
DIASTOLIC BLOOD PRESSURE: 79 MMHG | SYSTOLIC BLOOD PRESSURE: 124 MMHG | RESPIRATION RATE: 16 BRPM | BODY MASS INDEX: 28.72 KG/M2 | TEMPERATURE: 98.7 F | WEIGHT: 193.9 LBS | HEIGHT: 69 IN | OXYGEN SATURATION: 99 % | HEART RATE: 88 BPM

## 2023-05-01 DIAGNOSIS — R06.02 SOB (SHORTNESS OF BREATH): Primary | ICD-10-CM

## 2023-05-01 DIAGNOSIS — Z11.4 SCREENING FOR HIV (HUMAN IMMUNODEFICIENCY VIRUS): ICD-10-CM

## 2023-05-01 DIAGNOSIS — E61.1 IRON DEFICIENCY: ICD-10-CM

## 2023-05-01 DIAGNOSIS — I51.7 LEFT ATRIAL ENLARGEMENT: ICD-10-CM

## 2023-05-01 DIAGNOSIS — Z11.59 NEED FOR HEPATITIS C SCREENING TEST: ICD-10-CM

## 2023-05-01 LAB — FERRITIN SERPL-MCNC: 64 NG/ML (ref 6–175)

## 2023-05-01 PROCEDURE — 82728 ASSAY OF FERRITIN: CPT | Performed by: INTERNAL MEDICINE

## 2023-05-01 PROCEDURE — 36415 COLL VENOUS BLD VENIPUNCTURE: CPT | Performed by: INTERNAL MEDICINE

## 2023-05-01 PROCEDURE — 87389 HIV-1 AG W/HIV-1&-2 AB AG IA: CPT | Performed by: INTERNAL MEDICINE

## 2023-05-01 PROCEDURE — 86803 HEPATITIS C AB TEST: CPT | Performed by: INTERNAL MEDICINE

## 2023-05-01 PROCEDURE — 99214 OFFICE O/P EST MOD 30 MIN: CPT | Performed by: INTERNAL MEDICINE

## 2023-05-01 RX ORDER — PROPRANOLOL HYDROCHLORIDE 20 MG/1
TABLET ORAL
COMMUNITY
Start: 2023-04-19 | End: 2024-06-12

## 2023-05-01 RX ORDER — FERROUS SULFATE 325(65) MG
325 TABLET ORAL 2 TIMES DAILY
Qty: 180 TABLET | Refills: 1 | Status: SHIPPED | OUTPATIENT
Start: 2023-05-01 | End: 2024-06-12

## 2023-05-01 ASSESSMENT — PAIN SCALES - GENERAL: PAINLEVEL: NO PAIN (0)

## 2023-05-01 NOTE — PROGRESS NOTES
"  Assessment & Plan     SOB (shortness of breath)  - CT Chest w/o Contrast; Future    Screening for HIV (human immunodeficiency virus)  - HIV Antigen Antibody Combo    Need for hepatitis C screening test  - Hepatitis C Screen Reflex to HCV RNA Quant and Genotype    Left atrial enlargement  - Adult Cardiology Pete Ramirez Referral; Future    Iron deficiency  - Ferritin; Future  - ferrous sulfate (FEROSUL) 325 (65 Fe) MG tablet; Take 1 tablet (325 mg) by mouth 2 times daily       See Patient Instructions    HELIO BARTON MD  [facility] SHAN Cifuentes is a 42 year old, presenting for the following health issues:  Follow Up    Vane is here for follow up.  She has been having exertional SOB. She was started on iron supplements for iron deficiency.   Echo showed normal EF, moderate left atrial enlargement.   Lung and cardiac exam normal.       History of Present Illness       Reason for visit:  Follow up on my low iron condition    She eats 2-3 servings of fruits and vegetables daily.She consumes 0 sweetened beverage(s) daily.She exercises with enough effort to increase her heart rate 9 or less minutes per day.  She exercises with enough effort to increase her heart rate 3 or less days per week.   She is taking medications regularly.         Review of Systems       Objective    /79 (BP Location: Left arm, Patient Position: Sitting, Cuff Size: Adult Regular)   Pulse 88   Temp 98.7  F (37.1  C) (Tympanic)   Resp 16   Ht 1.753 m (5' 9\")   Wt 88 kg (193 lb 14.4 oz)   SpO2 99%   BMI 28.63 kg/m    Body mass index is 28.63 kg/m .  Physical Exam               "

## 2023-05-02 DIAGNOSIS — I51.7 LEFT ATRIAL ENLARGEMENT: Primary | ICD-10-CM

## 2023-05-02 LAB
HCV AB SERPL QL IA: NONREACTIVE
HIV 1+2 AB+HIV1 P24 AG SERPL QL IA: NONREACTIVE

## 2023-05-05 ENCOUNTER — ANCILLARY PROCEDURE (OUTPATIENT)
Dept: CT IMAGING | Facility: CLINIC | Age: 43
End: 2023-05-05
Attending: INTERNAL MEDICINE
Payer: COMMERCIAL

## 2023-05-05 DIAGNOSIS — R06.02 SOB (SHORTNESS OF BREATH): ICD-10-CM

## 2023-05-05 PROCEDURE — 71250 CT THORAX DX C-: CPT

## 2023-05-06 ENCOUNTER — HEALTH MAINTENANCE LETTER (OUTPATIENT)
Age: 43
End: 2023-05-06

## 2023-11-03 ENCOUNTER — OFFICE VISIT (OUTPATIENT)
Dept: URGENT CARE | Facility: URGENT CARE | Age: 43
End: 2023-11-03
Payer: COMMERCIAL

## 2023-11-03 VITALS
SYSTOLIC BLOOD PRESSURE: 124 MMHG | TEMPERATURE: 98.8 F | OXYGEN SATURATION: 96 % | DIASTOLIC BLOOD PRESSURE: 66 MMHG | HEART RATE: 85 BPM

## 2023-11-03 DIAGNOSIS — N39.0 URINARY TRACT INFECTION WITHOUT HEMATURIA, SITE UNSPECIFIED: Primary | ICD-10-CM

## 2023-11-03 LAB
ALBUMIN UR-MCNC: 100 MG/DL
APPEARANCE UR: CLEAR
BACTERIA #/AREA URNS HPF: ABNORMAL /HPF
BILIRUB UR QL STRIP: NEGATIVE
COLOR UR AUTO: YELLOW
GLUCOSE UR STRIP-MCNC: NEGATIVE MG/DL
HGB UR QL STRIP: ABNORMAL
KETONES UR STRIP-MCNC: NEGATIVE MG/DL
LEUKOCYTE ESTERASE UR QL STRIP: ABNORMAL
NITRATE UR QL: NEGATIVE
PH UR STRIP: 5.5 [PH] (ref 5–7)
RBC #/AREA URNS AUTO: ABNORMAL /HPF
SP GR UR STRIP: >=1.03 (ref 1–1.03)
SQUAMOUS #/AREA URNS AUTO: ABNORMAL /LPF
UROBILINOGEN UR STRIP-ACNC: 0.2 E.U./DL
WBC #/AREA URNS AUTO: ABNORMAL /HPF
WBC CLUMPS #/AREA URNS HPF: PRESENT /HPF

## 2023-11-03 PROCEDURE — 99214 OFFICE O/P EST MOD 30 MIN: CPT

## 2023-11-03 PROCEDURE — 87086 URINE CULTURE/COLONY COUNT: CPT

## 2023-11-03 PROCEDURE — 81001 URINALYSIS AUTO W/SCOPE: CPT

## 2023-11-03 PROCEDURE — 87186 SC STD MICRODIL/AGAR DIL: CPT

## 2023-11-03 RX ORDER — NITROFURANTOIN 25; 75 MG/1; MG/1
100 CAPSULE ORAL 2 TIMES DAILY
Qty: 10 CAPSULE | Refills: 0 | Status: SHIPPED | OUTPATIENT
Start: 2023-11-03 | End: 2023-11-08

## 2023-11-03 RX ORDER — PHENAZOPYRIDINE HYDROCHLORIDE 100 MG/1
100 TABLET, FILM COATED ORAL 3 TIMES DAILY PRN
Qty: 6 TABLET | Refills: 0 | Status: SHIPPED | OUTPATIENT
Start: 2023-11-03 | End: 2023-11-05

## 2023-11-03 NOTE — PROGRESS NOTES
"Assessment & Plan     Diagnosis:    ICD-10-CM    1. Urinary tract infection without hematuria, site unspecified  N39.0 UA Macroscopic with reflex to Microscopic and Culture - Clinic Collect     UA Microscopic with Reflex to Culture     Urine Culture     phenazopyridine (PYRIDIUM) 100 MG tablet     nitroFURantoin macrocrystal-monohydrate (MACROBID) 100 MG capsule        Medical Decision Making:  Vane Galicia is a 43 year old female who presents to clinic today for evaluation of urinary frequency, urgency and dysuria.     This clinically is consistent with a urinary tract infection.  Urinalysis confirms the infection. No concern for pregnancy; patient declines testing. There has been no fever, confusion, flank pain or significant abdominal pain.  The patient is not concerned about STDs and testing not indicated. There is no clinical evidence of pyelonephritis, appendicitis, colitis, diverticulitis or any intraabdominal catastrophe. The patient will be started on antibiotics for the infection. Go to the ER if increasing pain, vomiting, fever, or inability to tolerate the oral antibiotic.  Follow up with primary physician is indicated if not improving in 2-3 days.       Rio Fox PA-C  [facility]    Subjective     Vane Galicia is a 43 year old female who presents to clinic today for the following health issues:  Chief Complaint   Patient presents with    Urinary Problem     Pt reports urinary burning, pain X2 days. Pt reports having 'fibroids'         HPI  Patient states that for the past 2 days they experienced a burning sensation, and frequency and urgency of urination. This has gotten worse over time. They note that they have mild lower abdominal pain; believes this is related to her \"uterine fibroids.\"  Patient denies any flank or back pain, fever, nausea, vomiting, diarrhea, inability to urinate, vaginal discharge/bleeding or concerns for STDS.     Review of Systems    See " HPI    Objective      Vitals:    Patient Vitals for the past 24 hrs:   BP Temp Temp src Pulse SpO2   11/03/23 0957 124/66 98.8  F (37.1  C) Tympanic 85 96 %         Vital signs reviewed by: Rio Fox PA-C    Physical Exam   Constitutional: The patient is oriented to person, place, and time. Alert and cooperative. No acute distress.  Mouth: Mucous membranes are moist.  Cardiovascular: Regular rate and rhythm.  Pulmonary/Chest: Effort normal. No respiratory distress.   GI: Abdomen with mild suprapubic tenderness to palpation. No rebound or guarding. No McBurney point tenderness.   MSK: No CVA tenderness bilaterally.  Neurological: Alert and oriented x3.     Labs/Imaging:    Results for orders placed or performed in visit on 11/03/23   UA Macroscopic with reflex to Microscopic and Culture - Clinic Collect     Status: Abnormal    Specimen: Urine, Midstream   Result Value Ref Range    Color Urine Yellow Colorless, Straw, Light Yellow, Yellow    Appearance Urine Clear Clear    Glucose Urine Negative Negative mg/dL    Bilirubin Urine Negative Negative    Ketones Urine Negative Negative mg/dL    Specific Gravity Urine >=1.030 1.003 - 1.035    Blood Urine Small (A) Negative    pH Urine 5.5 5.0 - 7.0    Protein Albumin Urine 100 (A) Negative mg/dL    Urobilinogen Urine 0.2 0.2, 1.0 E.U./dL    Nitrite Urine Negative Negative    Leukocyte Esterase Urine Trace (A) Negative   UA Microscopic with Reflex to Culture     Status: Abnormal   Result Value Ref Range    Bacteria Urine Moderate (A) None Seen /HPF    RBC Urine 5-10 (A) 0-2 /HPF /HPF    WBC Urine 10-25 (A) 0-5 /HPF /HPF    Squamous Epithelials Urine Few (A) None Seen /LPF    WBC Clumps Urine Present (A) None Seen /HPF         Rio Fox PA-C, November 3, 2023

## 2023-11-05 LAB — BACTERIA UR CULT: ABNORMAL

## 2023-12-16 ENCOUNTER — HOSPITAL ENCOUNTER (EMERGENCY)
Facility: CLINIC | Age: 43
Discharge: HOME | End: 2023-12-16
Attending: EMERGENCY MEDICINE | Admitting: EMERGENCY MEDICINE
Payer: COMMERCIAL

## 2023-12-16 VITALS
OXYGEN SATURATION: 99 % | SYSTOLIC BLOOD PRESSURE: 160 MMHG | RESPIRATION RATE: 18 BRPM | TEMPERATURE: 98.4 F | HEART RATE: 98 BPM | DIASTOLIC BLOOD PRESSURE: 84 MMHG

## 2023-12-16 DIAGNOSIS — R09.81 NASAL CONGESTION: ICD-10-CM

## 2023-12-16 DIAGNOSIS — B33.8 RSV INFECTION: ICD-10-CM

## 2023-12-16 DIAGNOSIS — N30.00 ACUTE CYSTITIS WITHOUT HEMATURIA: ICD-10-CM

## 2023-12-16 LAB
ALBUMIN UR-MCNC: NEGATIVE MG/DL
APPEARANCE UR: CLEAR
BILIRUB UR QL STRIP: NEGATIVE
COLOR UR AUTO: ABNORMAL
FLUAV RNA SPEC QL NAA+PROBE: NEGATIVE
FLUBV RNA RESP QL NAA+PROBE: NEGATIVE
GLUCOSE UR STRIP-MCNC: NEGATIVE MG/DL
HCG UR QL: NEGATIVE
HGB UR QL STRIP: NEGATIVE
KETONES UR STRIP-MCNC: NEGATIVE MG/DL
LEUKOCYTE ESTERASE UR QL STRIP: ABNORMAL
MUCOUS THREADS #/AREA URNS LPF: PRESENT /LPF
NITRATE UR QL: NEGATIVE
PH UR STRIP: 6 [PH] (ref 5–7)
RBC URINE: 1 /HPF
RSV RNA SPEC NAA+PROBE: POSITIVE
SARS-COV-2 RNA RESP QL NAA+PROBE: NEGATIVE
SP GR UR STRIP: 1.03 (ref 1–1.03)
SQUAMOUS EPITHELIAL: 2 /HPF
UROBILINOGEN UR STRIP-MCNC: NORMAL MG/DL
WBC URINE: 7 /HPF

## 2023-12-16 PROCEDURE — 81025 URINE PREGNANCY TEST: CPT | Performed by: EMERGENCY MEDICINE

## 2023-12-16 PROCEDURE — 99284 EMERGENCY DEPT VISIT MOD MDM: CPT

## 2023-12-16 PROCEDURE — 250N000013 HC RX MED GY IP 250 OP 250 PS 637: Performed by: EMERGENCY MEDICINE

## 2023-12-16 PROCEDURE — 81001 URINALYSIS AUTO W/SCOPE: CPT | Performed by: EMERGENCY MEDICINE

## 2023-12-16 PROCEDURE — 87637 SARSCOV2&INF A&B&RSV AMP PRB: CPT | Performed by: EMERGENCY MEDICINE

## 2023-12-16 PROCEDURE — 87086 URINE CULTURE/COLONY COUNT: CPT | Performed by: EMERGENCY MEDICINE

## 2023-12-16 RX ORDER — PHENAZOPYRIDINE HYDROCHLORIDE 100 MG/1
100 TABLET, FILM COATED ORAL 3 TIMES DAILY PRN
Qty: 6 TABLET | Refills: 0 | Status: SHIPPED | OUTPATIENT
Start: 2023-12-16 | End: 2023-12-19

## 2023-12-16 RX ORDER — PHENAZOPYRIDINE HYDROCHLORIDE 100 MG/1
100 TABLET, FILM COATED ORAL
Status: DISCONTINUED | OUTPATIENT
Start: 2023-12-16 | End: 2023-12-16

## 2023-12-16 RX ORDER — CEPHALEXIN 500 MG/1
500 CAPSULE ORAL ONCE
Qty: 1 CAPSULE | Refills: 0 | Status: COMPLETED | OUTPATIENT
Start: 2023-12-16 | End: 2023-12-16

## 2023-12-16 RX ORDER — PHENAZOPYRIDINE HYDROCHLORIDE 100 MG/1
100 TABLET, FILM COATED ORAL ONCE
Status: COMPLETED | OUTPATIENT
Start: 2023-12-16 | End: 2023-12-16

## 2023-12-16 RX ORDER — CEPHALEXIN 500 MG/1
500 CAPSULE ORAL 2 TIMES DAILY
Qty: 14 CAPSULE | Refills: 0 | Status: SHIPPED | OUTPATIENT
Start: 2023-12-16 | End: 2023-12-23

## 2023-12-16 RX ADMIN — PHENAZOPYRIDINE HYDROCHLORIDE 100 MG: 100 TABLET ORAL at 04:49

## 2023-12-16 RX ADMIN — CEPHALEXIN 500 MG: 500 CAPSULE ORAL at 04:25

## 2023-12-16 ASSESSMENT — ACTIVITIES OF DAILY LIVING (ADL): ADLS_ACUITY_SCORE: 33

## 2023-12-16 NOTE — ED PROVIDER NOTES
History     Chief Complaint:  Rule out Urinary Tract Infection and Nasal Congestion       The history is provided by the patient and the spouse.      Vane Galicia is a 43 year old female with a history of fibroids who presents with bad nasal congestion and severe dysuria. The patient reports she has had symptoms for the past 2 days, with the dysuria increasing significantly today. She has been taking ibuprofen, and is unsure about a fever.  No flank pain or vomiting.  She also reports that she has been sneezing quite a bit.  No known COVID exposures.  She has not taken a COVID test at home.  No breathing difficulties.  She reports that she had a urinary tract infection recently and it was treated.  She is concerned that this has recurred.    Independent Historian:    Spouse - supplemented history    Review of External Notes:  Reviewed urgent care note from 11/3/2023.  Patient was treated with nitrofurantoin.    Reviewed urine culture from 11/3/2023.  Patient urine culture grew 10-50,000 and colony-forming units per mL of E. coli.  It was pan susceptible.    Medications:    Ferosul  Prozac  Hydroxyzine  Propranolol    Past Medical History:    Anxiety   Mass of left breast  Depression   Uterine leiomyoma  Left atrial enlargement  Iron deficiency    Past Surgical History:    Combined EGD    Physical Exam   Patient Vitals for the past 24 hrs:   BP Temp Temp src Pulse Resp SpO2   12/16/23 0241 -- 98.4  F (36.9  C) Oral -- -- --   12/16/23 0240 (!) 160/84 -- -- 98 18 99 %        Physical Exam  General: Well-nourished  Eyes: PERRL, conjunctivae pink no scleral icterus or conjunctival injection  ENT:  + Clear rhinorrhea.  Moist mucus membranes, posterior oropharynx clear without erythema or exudates  Respiratory:  Lungs clear to auscultation bilaterally, no crackles/rubs/wheezes.  Good air movement  CV: Normal rate and rhythm, no murmurs/rubs/gallops  GI:  Abdomen soft and non-distended.  Normoactive BS.  No  tenderness, guarding or rebound  Skin: Warm, dry.  No rashes or petechiae  Musculoskeletal: No peripheral edema or calf tenderness  Neuro: Alert and oriented to person/place/time  Psychiatric: Normal affect      Emergency Department Course     Laboratory:  Labs Ordered and Resulted from Time of ED Arrival to Time of ED Departure   UA MACROSCOPIC WITH REFLEX TO MICRO AND CULTURE - Abnormal       Result Value    Color Urine Orange (*)     Appearance Urine Clear      Glucose Urine Negative      Bilirubin Urine Negative      Ketones Urine Negative      Specific Gravity Urine 1.029      Blood Urine Negative      pH Urine 6.0      Protein Albumin Urine Negative      Urobilinogen Urine Normal      Nitrite Urine Negative      Leukocyte Esterase Urine Moderate (*)     Mucus Urine Present (*)     RBC Urine 1      WBC Urine 7 (*)     Squamous Epithelials Urine 2 (*)    INFLUENZA A/B, RSV, & SARS-COV2 PCR - Abnormal    Influenza A PCR Negative      Influenza B PCR Negative      RSV PCR Positive (*)     SARS CoV2 PCR Negative     HCG QUALITATIVE URINE - Normal    hCG Urine Qualitative Negative     URINE CULTURE     Emergency Department Course & Assessments:    Interventions:  Medications   cephALEXin (KEFLEX) capsule 500 mg (500 mg Oral $Given 12/16/23 3577)   phenazopyridine (PYRIDIUM) tablet 100 mg (100 mg Oral $Given 12/16/23 4405)        Assessments:  0409 I obtained history and examined the patient as noted above.  0521 I rechecked the patient and explained findings. We discussed plan for discharge and patient is in agreement with plan.     Independent Interpretation (X-rays, CTs, rhythm strip):  None    Consultations/Discussion of Management or Tests:  None    Social Determinants of Health affecting care:  None     Disposition:  The patient was discharged to home.     Impression & Plan    CMS Diagnoses: None    Medical Decision Making:  Vane Galicia is a 43 year old female who comes with symptoms of cystitis but no  symptoms or findings concerning for pyelonephritis.  Urinalysis is consistent with possible infection.  I urinary culture is sent.  I reviewed previous urine cultures and her last urinary tract infection was pansensitive.  She had previously been prescribed nitrofurantoin.  I will start her on cephalexin.  A repeat urine culture is pending.  She was also given phenazopyridine for urinary anesthetic.  She is not vomiting and otherwise well-appearing and so I think she is safe for outpatient treatment for urinary tract infection.  Additionally, she reports sneezing and significant rhinorrhea.  She has clear lungs.  She is saturating well and has no respiratory distress.  Viral testing for COVID, influenza and RSV was undertaken.  RSV testing was positive.  Fortunately, influenza and COVID testing were negative.  I explained to her that there is no specific treatment for RSV.  From this perspective, I do feel she is safe for discharge home should.  She was alerted that this is contagious and can be dangerous in young children or the elderly.  At this time, with reasonable clinical confidence, I do believe she safe for discharge home.    Diagnosis:    ICD-10-CM    1. Acute cystitis without hematuria  N30.00       2. Nasal congestion  R09.81       3. RSV infection  B33.8            Discharge Medications:  Discharge Medication List as of 12/16/2023  5:28 AM        START taking these medications    Details   cephALEXin (KEFLEX) 500 MG capsule Take 1 capsule (500 mg) by mouth 2 times daily for 7 days, Disp-14 capsule, R-0, E-Prescribe      phenazopyridine (PYRIDIUM) 100 MG tablet Take 1 tablet (100 mg) by mouth 3 times daily as needed for irritation Do not take more than three days., Disp-6 tablet, R-0, E-Prescribe            Scribe Disclosure:  Jeff PATINO, am serving as a scribe at 4:13 AM on 12/16/2023 to document services personally performed by Laura Issa MD, based on my observations and the provider's  statements to me.  12/16/2023   Laura Issa MD Cho, Amy C, MD  12/16/23 0701

## 2023-12-16 NOTE — DISCHARGE INSTRUCTIONS
*You may resume diet and activities as tolerated.  *Take medications as prescribed.  Ciprofloxacin as directed.  Phenazopyridine as needed for painful urination.  This will change the color of your urine to red.  Continue your current medications.  *Follow up with your doctor in the next 2-3 days for a recheck.,  *Return if you develop fever, back pain, vomiting, unable to urinate, faint or feel like you will faint or become worse in any way.    Discharge Instructions  Urinary Tract Infection  You or your child have been diagnosed with a urinary tract infection, or UTI. The urinary tract includes the kidneys (which make urine/pee), ureters (the tubes that carry urine/pee from the kidneys to the bladder), the bladder (which stores urine/pee), and urethra (the tube that carries urine/pee out of the bladder). Urinary tract infections occur when bacteria travel up the urethra into the bladder (bladder infection) and, in some cases, from there into the kidneys (kidney infection).  Generally, every Emergency Department visit should have a follow-up clinic visit with either a primary or a specialty clinic/provider. Please follow-up as instructed by your emergency provider today.  Return to the Emergency Department if:  You or your child have severe back pain.  You or your child are vomiting (throwing up) so that you cannot take your medicine.  You or your child have a new fever (had not previously had a fever) over 101 F.  You or your child have confusion or are very weak, or feel very ill.  Your child seems much more ill, will not wake up, will not respond right, or is crying for a long time and will not calm down.  You or your child are showing signs of dehydration. These signs may include decreased urination (pee), dry mouth/gums/tongue, or decreased activity.    Follow-up with your provider:   Children under 24 months need to be seen by their regular provider within one week after a diagnosis of a UTI. It may be  necessary to do some more tests to look at the child s kidney or bladder.  You should begin to feel better within 24 - 48 hours of starting your antibiotic; follow-up with your regular clinic/doctor/provider if this is not the case.    Treatment:   You will be treated with an antibiotic to kill the bacteria. We have to make an educated guess, based on what we know about common bacteria and antibiotics, as to which antibiotic will work for your infection. We will be correct most times but there will be some cases where the antibiotic chosen is not correct (see urine cultures below).  Take a pain medication such as acetaminophen (Tylenol ) or ibuprofen (Advil , Motrin , Nuprin ).  Phenazopyridine (Pyridium , Uristat ) is a prescription medication that numbs the bladder to reduce the burning pain of some UTIs.  The same medication is available in a non-prescription version (Azo-Standard , Urodol ). This medication will change the color of the urine and tears (usually blue or orange). If you wear contacts, do not wear them while taking this medication as they may be stained by the medication.    Urine Cultures:  If indicated, a urine culture may have been performed today. This test generally takes 24-48 hours to complete so the results are not known at this time. The results can confirm that an infection is present but also determine which antibiotic is effective for the specific bacteria that is causing the infection. If your urine culture shows that the antibiotic you were given today will not work to treat your infection, we will attempt to contact you to make arrangements to change the antibiotic. If the culture confirms that the antibiotic is effective for your infection, you will not be contacted. We often recommend follow-up with your regular physician/provider on the culture results regardless of this process.    Antibiotic Warning:   If you have been placed on antibiotics - watch for signs of allergic reaction.  " These include rash, lip swelling, difficulty breathing, wheezing, and dizziness.  If you develop any of these symptoms, stop the antibiotic immediately and go to an emergency room or urgent care for evaluation.    Probiotics: If you have been given an antibiotic, you may want to also take a probiotic pill or eat yogurt with live cultures. Probiotics have \"good bacteria\" to help your intestines stay healthy. Studies have shown that probiotics help prevent diarrhea and other intestine problems (including C. diff infection) when you take antibiotics. You can buy these without a prescription in the pharmacy section of the store.   If you were given a prescription for medicine here today, be sure to read all of the information (including the package insert) that comes with your prescription.  This will include important information about the medicine, its side effects, and any warnings that you need to know about.  The pharmacist who fills the prescription can provide more information and answer questions you may have about the medicine.  If you have questions or concerns that the pharmacist cannot address, please call or return to the Emergency Department.   Remember that you can always come back to the Emergency Department if you are not able to see your regular provider in the amount of time listed above, if you get any new symptoms, or if there is anything that worries you.      "

## 2023-12-16 NOTE — ED TRIAGE NOTES
Pain with urination started yesterday. Recently treated for UTI 2 months ago. Pt denies urinary frequency. Pain only with urination. Pt also c/o congestion, sneezing, no cough.       Took ibuprophen about midnight

## 2023-12-17 LAB
BACTERIA UR CULT: ABNORMAL
BACTERIA UR CULT: ABNORMAL

## 2024-05-05 ENCOUNTER — HEALTH MAINTENANCE LETTER (OUTPATIENT)
Age: 44
End: 2024-05-05

## 2024-06-07 ENCOUNTER — TELEPHONE (OUTPATIENT)
Dept: FAMILY MEDICINE | Facility: CLINIC | Age: 44
End: 2024-06-07
Payer: COMMERCIAL

## 2024-06-07 NOTE — TELEPHONE ENCOUNTER
Reason for Call:  Appointment Request    Patient requesting this type of appt: Chronic Diease Management/Medication/Follow-Up    Requested provider: Sarai Enriquez    Reason patient unable to be scheduled: Not within requested timeframe    When does patient want to be seen/preferred time: 1-2 weeks    Comments: Would like to be seen sooner than what is scheduled. She needs some blood tests.    Could we send this information to you in [place] or would you prefer to receive a phone call?:   Patient would prefer a phone call   Okay to leave a detailed message?: Yes at Home number on file [phone] (home)    Call taken on 6/7/2024 at 9:19 AM by Mera Marks

## 2024-06-12 ENCOUNTER — OFFICE VISIT (OUTPATIENT)
Dept: FAMILY MEDICINE | Facility: CLINIC | Age: 44
End: 2024-06-12
Payer: COMMERCIAL

## 2024-06-12 ENCOUNTER — OFFICE VISIT (OUTPATIENT)
Dept: URGENT CARE | Facility: URGENT CARE | Age: 44
End: 2024-06-12
Payer: COMMERCIAL

## 2024-06-12 VITALS
HEART RATE: 81 BPM | TEMPERATURE: 98.6 F | OXYGEN SATURATION: 97 % | DIASTOLIC BLOOD PRESSURE: 77 MMHG | SYSTOLIC BLOOD PRESSURE: 119 MMHG

## 2024-06-12 VITALS
DIASTOLIC BLOOD PRESSURE: 67 MMHG | HEIGHT: 69 IN | SYSTOLIC BLOOD PRESSURE: 106 MMHG | HEART RATE: 86 BPM | OXYGEN SATURATION: 97 % | TEMPERATURE: 97.6 F | BODY MASS INDEX: 30.84 KG/M2 | WEIGHT: 208.2 LBS | RESPIRATION RATE: 18 BRPM

## 2024-06-12 DIAGNOSIS — Z13.1 SCREENING FOR DIABETES MELLITUS: ICD-10-CM

## 2024-06-12 DIAGNOSIS — N94.89 VAGINAL BURNING: ICD-10-CM

## 2024-06-12 DIAGNOSIS — R30.0 DYSURIA: Primary | ICD-10-CM

## 2024-06-12 DIAGNOSIS — Z00.00 ANNUAL PHYSICAL EXAM: Primary | ICD-10-CM

## 2024-06-12 DIAGNOSIS — R41.3 MEMORY LOSS: ICD-10-CM

## 2024-06-12 DIAGNOSIS — Z23 NEED FOR VACCINATION: ICD-10-CM

## 2024-06-12 DIAGNOSIS — Z13.6 CARDIOVASCULAR SCREENING; LDL GOAL LESS THAN 130: ICD-10-CM

## 2024-06-12 DIAGNOSIS — Z12.11 SCREEN FOR COLON CANCER: ICD-10-CM

## 2024-06-12 DIAGNOSIS — E61.1 IRON DEFICIENCY: ICD-10-CM

## 2024-06-12 DIAGNOSIS — Z12.31 ENCOUNTER FOR SCREENING MAMMOGRAM FOR BREAST CANCER: ICD-10-CM

## 2024-06-12 DIAGNOSIS — R53.83 OTHER FATIGUE: ICD-10-CM

## 2024-06-12 DIAGNOSIS — E53.8 VITAMIN B12 DEFICIENCY (NON ANEMIC): ICD-10-CM

## 2024-06-12 LAB
ALBUMIN UR-MCNC: >=300 MG/DL
APPEARANCE UR: CLEAR
BACTERIA #/AREA URNS HPF: ABNORMAL /HPF
BILIRUB UR QL STRIP: NEGATIVE
COLOR UR AUTO: YELLOW
ERYTHROCYTE [DISTWIDTH] IN BLOOD BY AUTOMATED COUNT: 13.9 % (ref 10–15)
GLUCOSE UR STRIP-MCNC: NEGATIVE MG/DL
HBA1C MFR BLD: 5.4 % (ref 0–5.6)
HCT VFR BLD AUTO: 41 % (ref 35–47)
HGB BLD-MCNC: 13.1 G/DL (ref 11.7–15.7)
HGB UR QL STRIP: ABNORMAL
KETONES UR STRIP-MCNC: NEGATIVE MG/DL
LEUKOCYTE ESTERASE UR QL STRIP: NEGATIVE
MCH RBC QN AUTO: 27.8 PG (ref 26.5–33)
MCHC RBC AUTO-ENTMCNC: 32 G/DL (ref 31.5–36.5)
MCV RBC AUTO: 87 FL (ref 78–100)
NITRATE UR QL: POSITIVE
PH UR STRIP: 6.5 [PH] (ref 5–7)
PLATELET # BLD AUTO: 356 10E3/UL (ref 150–450)
RBC # BLD AUTO: 4.71 10E6/UL (ref 3.8–5.2)
RBC #/AREA URNS AUTO: ABNORMAL /HPF
SP GR UR STRIP: >=1.03 (ref 1–1.03)
SQUAMOUS #/AREA URNS AUTO: ABNORMAL /LPF
UROBILINOGEN UR STRIP-ACNC: 0.2 E.U./DL
WBC # BLD AUTO: 10.4 10E3/UL (ref 4–11)
WBC #/AREA URNS AUTO: ABNORMAL /HPF

## 2024-06-12 PROCEDURE — 91320 SARSCV2 VAC 30MCG TRS-SUC IM: CPT

## 2024-06-12 PROCEDURE — 36415 COLL VENOUS BLD VENIPUNCTURE: CPT

## 2024-06-12 PROCEDURE — 83036 HEMOGLOBIN GLYCOSYLATED A1C: CPT

## 2024-06-12 PROCEDURE — 81001 URINALYSIS AUTO W/SCOPE: CPT | Performed by: PHYSICIAN ASSISTANT

## 2024-06-12 PROCEDURE — 82728 ASSAY OF FERRITIN: CPT

## 2024-06-12 PROCEDURE — 87186 SC STD MICRODIL/AGAR DIL: CPT | Performed by: PHYSICIAN ASSISTANT

## 2024-06-12 PROCEDURE — 99214 OFFICE O/P EST MOD 30 MIN: CPT | Mod: 25

## 2024-06-12 PROCEDURE — 87086 URINE CULTURE/COLONY COUNT: CPT | Performed by: PHYSICIAN ASSISTANT

## 2024-06-12 PROCEDURE — 82306 VITAMIN D 25 HYDROXY: CPT

## 2024-06-12 PROCEDURE — 82607 VITAMIN B-12: CPT

## 2024-06-12 PROCEDURE — 99213 OFFICE O/P EST LOW 20 MIN: CPT | Mod: 25 | Performed by: PHYSICIAN ASSISTANT

## 2024-06-12 PROCEDURE — 80061 LIPID PANEL: CPT

## 2024-06-12 PROCEDURE — 85027 COMPLETE CBC AUTOMATED: CPT

## 2024-06-12 PROCEDURE — 90746 HEPB VACCINE 3 DOSE ADULT IM: CPT

## 2024-06-12 PROCEDURE — 90480 ADMN SARSCOV2 VAC 1/ONLY CMP: CPT

## 2024-06-12 PROCEDURE — 90472 IMMUNIZATION ADMIN EACH ADD: CPT

## 2024-06-12 PROCEDURE — 90715 TDAP VACCINE 7 YRS/> IM: CPT

## 2024-06-12 PROCEDURE — 90471 IMMUNIZATION ADMIN: CPT

## 2024-06-12 PROCEDURE — 83540 ASSAY OF IRON: CPT

## 2024-06-12 PROCEDURE — 84443 ASSAY THYROID STIM HORMONE: CPT

## 2024-06-12 PROCEDURE — 80053 COMPREHEN METABOLIC PANEL: CPT

## 2024-06-12 PROCEDURE — 83550 IRON BINDING TEST: CPT

## 2024-06-12 PROCEDURE — 99396 PREV VISIT EST AGE 40-64: CPT | Mod: 25

## 2024-06-12 RX ORDER — CEFDINIR 300 MG/1
300 CAPSULE ORAL 2 TIMES DAILY
Qty: 14 CAPSULE | Refills: 0 | Status: SHIPPED | OUTPATIENT
Start: 2024-06-12 | End: 2024-06-19

## 2024-06-12 RX ORDER — FERROUS SULFATE 325(65) MG
325 TABLET ORAL
Qty: 180 TABLET | Refills: 1 | Status: SHIPPED | OUTPATIENT
Start: 2024-06-12

## 2024-06-12 RX ORDER — METRONIDAZOLE 500 MG/1
500 TABLET ORAL 2 TIMES DAILY
Qty: 14 TABLET | Refills: 0 | Status: SHIPPED | OUTPATIENT
Start: 2024-06-12 | End: 2024-06-19

## 2024-06-12 SDOH — HEALTH STABILITY: PHYSICAL HEALTH: ON AVERAGE, HOW MANY DAYS PER WEEK DO YOU ENGAGE IN MODERATE TO STRENUOUS EXERCISE (LIKE A BRISK WALK)?: 3 DAYS

## 2024-06-12 SDOH — HEALTH STABILITY: PHYSICAL HEALTH: ON AVERAGE, HOW MANY MINUTES DO YOU ENGAGE IN EXERCISE AT THIS LEVEL?: 60 MIN

## 2024-06-12 ASSESSMENT — PATIENT HEALTH QUESTIONNAIRE - PHQ9
SUM OF ALL RESPONSES TO PHQ QUESTIONS 1-9: 20
SUM OF ALL RESPONSES TO PHQ QUESTIONS 1-9: 20
10. IF YOU CHECKED OFF ANY PROBLEMS, HOW DIFFICULT HAVE THESE PROBLEMS MADE IT FOR YOU TO DO YOUR WORK, TAKE CARE OF THINGS AT HOME, OR GET ALONG WITH OTHER PEOPLE: EXTREMELY DIFFICULT

## 2024-06-12 ASSESSMENT — SOCIAL DETERMINANTS OF HEALTH (SDOH): HOW OFTEN DO YOU GET TOGETHER WITH FRIENDS OR RELATIVES?: ONCE A WEEK

## 2024-06-12 NOTE — PROGRESS NOTES
Dysuria  - UA Macroscopic with reflex to Microscopic and Culture - Clinic Collect  - Urine Microscopic Exam  - Urine Culture  - cefdinir (OMNICEF) 300 MG capsule; Take 1 capsule (300 mg) by mouth 2 times daily for 7 days    Vaginal burning  - metroNIDAZOLE (FLAGYL) 500 MG tablet; Take 1 tablet (500 mg) by mouth 2 times daily for 7 days    General Tips for All Ages:    Hydration:  Why: Drinking plenty of water helps flush out bacteria from the urinary system.  What to Do: Aim for at least 8 glasses of water per day.    Cranberry Juice:  Why: Some studies suggest cranberry juice may help prevent bacterial adherence in the urinary tract.  What to Do: Drink pure, unsweetened cranberry juice. Limit added sugars.    For Adults (18 Years and Older):    Over-the-Counter (OTC) Pain Relievers:  Why: Relieves discomfort and pain.  What to Use: Ibuprofen or acetaminophen as directed on the package.    Urinary Alkalinizers (if prescribed):  Why: Alters the acidity of the urine, providing relief.  What to Do: Take as prescribed by your healthcare provider.    Antibiotics (if prescribed):  Why: Eliminates the bacterial infection.  What to Do: Take the full course of antibiotics as directed, even if symptoms improve.    For Adolescents (12-17 Years):    OTC Pain Relievers:  Why: Manages pain and discomfort.  What to Use: Ibuprofen or acetaminophen following package instructions.    Hydration:  Why: Helps flush out bacteria.  What to Do: Drink plenty of water; avoid sugary drinks.    Seek Medical Attention:  When: If symptoms persist or worsen.  What to Do: Contact your healthcare provider for further evaluation.    For Children (Under 12 Years):    Hydration:  Why: Promotes urinary system flushing.  What to Do: Encourage your child to drink water regularly.    Avoid Irritants:  Why: Certain soaps or bubble baths can worsen symptoms.  What to Do: Use gentle, fragrance-free products for bathing.    OTC Pain Relievers (if approved  by pediatrician):  Why: Eases pain and discomfort.  What to Use: Follow your pediatrician's advice on using ibuprofen or acetaminophen.    Seek Medical Attention:  When: If symptoms persist or if your child has a high fever.  What to Do: Contact your child's pediatrician for guidance.    All Ages:    Probiotics:  Why: May help restore healthy bacteria in the gut.  What to Do: Consider including probiotic-rich foods or supplements.    Avoid Irritants:  Why: Certain foods and drinks can worsen symptoms.  What to Do: Limit caffeine, spicy foods, and alcohol during the infection.    Follow-up:    Patient advised to return to clinic for reevaluation (either UC or PCP) if symptoms do not improve in 7 days. Patient educated on red flag symptoms and asked to go directly to the ED if these symptoms present themselves.     Remember, individual cases may vary, and it's crucial to follow your healthcare provider's advice. If you have concerns or if symptoms persist, don't hesitate to reach out for further guidance.    Wishing you a [place] recovery!      Don Lion PA-C  [facility] URG[facility]    Subjective   44 year old who presents to clinic today for the following health issues:    Urgent Care       HPI     Genitourinary - Female  Onset/Duration:     UTI sx frequency, burning, pressure  in pelvic area x yesterday     Description:   Painful urination (Dysuria): YES           Frequency: YES  Blood in urine (Hematuria): No  Delay in urine (Hesitency): YES  Intensity: mild  Progression of Symptoms:  same  Accompanying Signs & Symptoms:  Fever/chills: No  Flank pain: No  Nausea and vomiting: No  Vaginal symptoms: Hard to say but she doesn't think so.   Abdominal/Pelvic Pain: No  History:   History of frequent UTI s: No  History of kidney stones: No  Precipitating or alleviating factors: None  Therapies tried and outcome: None     Review of Systems   Review of Systems   See HPI    Objective    Temp: 98.6  F (37  C) Temp  src: Tympanic BP: 119/77 Pulse: 81     SpO2: 97 %       Physical Exam   Physical Exam  Constitutional:       General: She is not in acute distress.     Appearance: Normal appearance. She is normal weight. She is not ill-appearing, toxic-appearing or diaphoretic.   HENT:      Head: Normocephalic and atraumatic.   Cardiovascular:      Rate and Rhythm: Normal rate.      Pulses: Normal pulses.   Pulmonary:      Effort: Pulmonary effort is normal. No respiratory distress.   Abdominal:      Tenderness: There is no right CVA tenderness or left CVA tenderness.   Neurological:      General: No focal deficit present.      Mental Status: She is alert and oriented to person, place, and time. Mental status is at baseline.      Gait: Gait normal.   Psychiatric:         Mood and Affect: Mood normal.         Behavior: Behavior normal.         Thought Content: Thought content normal.         Judgment: Judgment normal.          Results for orders placed or performed in visit on 06/12/24 (from the past 24 hour(s))   UA Macroscopic with reflex to Microscopic and Culture - Clinic Collect    Specimen: Urine, Clean Catch   Result Value Ref Range    Color Urine Yellow Colorless, Straw, Light Yellow, Yellow    Appearance Urine Clear Clear    Glucose Urine Negative Negative mg/dL    Bilirubin Urine Negative Negative    Ketones Urine Negative Negative mg/dL    Specific Gravity Urine >=1.030 1.003 - 1.035    Blood Urine Moderate (A) Negative    pH Urine 6.5 5.0 - 7.0    Protein Albumin Urine >=300 (A) Negative mg/dL    Urobilinogen Urine 0.2 0.2, 1.0 E.U./dL    Nitrite Urine Positive (A) Negative    Leukocyte Esterase Urine Negative Negative   Urine Microscopic Exam   Result Value Ref Range    Bacteria Urine Many (A) None Seen /HPF    RBC Urine 10-25 (A) 0-2 /HPF /HPF    WBC Urine  (A) 0-5 /HPF /HPF    Squamous Epithelials Urine Few (A) None Seen /LPF

## 2024-06-12 NOTE — NURSING NOTE
Prior to immunization administration, verified patients identity using patient s name and date of birth. Please see Immunization Activity for additional information.     Screening Questionnaire for Adult Immunization    Are you sick today?   No   Do you have allergies to medications, food, a vaccine component or latex?   No   Have you ever had a serious reaction after receiving a vaccination?   No   Do you have a long-term health problem with heart, lung, kidney, or metabolic disease (e.g., diabetes), asthma, a blood disorder, no spleen, complement component deficiency, a cochlear implant, or a spinal fluid leak?  Are you on long-term aspirin therapy?   No   Do you have cancer, leukemia, HIV/AIDS, or any other immune system problem?   No   Do you have a parent, brother, or sister with an immune system problem?   Don't Know   In the past 3 months, have you taken medications that affect  your immune system, such as prednisone, other steroids, or anticancer drugs; drugs for the treatment of rheumatoid arthritis, Crohn s disease, or psoriasis; or have you had radiation treatments?   No   Have you had a seizure, or a brain or other nervous system problem?   No   During the past year, have you received a transfusion of blood or blood    products, or been given immune (gamma) globulin or antiviral drug?   Don't Know   For women: Are you pregnant or is there a chance you could become       pregnant during the next month?   No   Have you received any vaccinations in the past 4 weeks?   No     Immunization questionnaire answers were all negative.      Patient instructed to remain in clinic for 15 minutes afterwards, and to report any adverse reactions.     Screening performed by Fauzia Abraham MA on 6/12/2024 at 3:54 PM.

## 2024-06-12 NOTE — PROGRESS NOTES
"Preventive Care Visit  [facility] SHAN Gonzales DNP, Geriatric Medicine  Jun 12, 2024      Assessment & Plan     Annual physical exam  UTD on pap, due for colonoscopy and mammogram; will provide referrals and phone number to schedule. Will update vaccines today.    Memory loss  Following with psych for mental health management, recently had workup for ADHD. Continues to struggle with memory problems. Will refer to OT for additional evaluation/recommendations  - Occupational Therapy  Referral; Future    Other fatigue  Ongoing fatigue with unknown etiology; seeing psychiatry for mental health management. Will look into other deficiency or metabolic cause  - Comprehensive metabolic panel; Future  - CBC with platelets; Future  - Iron & Iron Binding Capacity; Future  - Vitamin B12; Future  - Vitamin D Deficiency; Future  - TSH with free T4 reflex; Future  - Ferritin; Future    Iron deficiency  Hx of uterine fibroids with menorrhagia  - CBC with platelets; Future  - Iron & Iron Binding Capacity; Future  - Ferritin; Future  - ferrous sulfate (FEROSUL) 325 (65 Fe) MG tablet; Take 1 tablet (325 mg) by mouth daily (with breakfast)    Screen for colon cancer  - Colonoscopy Screening  Referral; Future    Encounter for screening mammogram for breast cancer  - MA Screen Bilateral w/Helder; Future    Screening for diabetes mellitus  - Hemoglobin A1c; Future    CARDIOVASCULAR SCREENING; LDL GOAL LESS THAN 130  - Lipid Profile; Future    Need for vaccination  - HEPATITIS B, ADULT 20+ (ENGERIX-B/RECOMBIVAX HB)  - TDAP 10-64Y (ADACEL,BOOSTRIX)  - COVID-19 12+ (2023-24) (PFIZER)    Patient has been advised of split billing requirements and indicates understanding: Yes        BMI  Estimated body mass index is 30.84 kg/m  as calculated from the following:    Height as of this encounter: 1.75 m (5' 8.9\").    Weight as of this encounter: 94.4 kg (208 lb 3.2 oz).   Weight management plan: Discussed " healthy diet and exercise guidelines    Counseling  Appropriate preventive services were discussed with this patient, including applicable screening as appropriate for fall prevention, nutrition, physical activity, Tobacco-use cessation, weight loss and cognition.  Checklist reviewing preventive services available has been given to the patient.  Reviewed patient's diet, addressing concerns and/or questions.   She is at risk for lack of exercise and has been provided with information to increase physical activity for the benefit of her well-being.   The patient was instructed to see the dentist every 6 months.   The patient's PHQ-9 score is consistent with severe depression. She was provided with information regarding depression.         FUTURE APPOINTMENTS:       - Follow-up for annual visit or as needed    Subjective   Vane is a 44 year old, presenting for the following:  Physical         Health Care Directive  Patient does not have a Health Care Directive or Living Will: Discussed advance care planning with patient; however, patient declined at this time.    Was referred by psychiatrist and OBGYN for lab work as well as ongoing memory problems and fatigue  Recently increased her prozac from 10 mg to 20 mg daily by psych. Also had ADHD testing done, reports that was positive  Sees OBGYN for uterine fibroids and menorrhagia.                   6/12/2024   General Health   How would you rate your overall physical health? (!) POOR   Feel stress (tense, anxious, or unable to sleep) Very much   (!) STRESS CONCERN      6/12/2024   Nutrition   Three or more servings of calcium each day? Yes   Diet: I don't know   How many servings of fruit and vegetables per day? 4 or more   How many sweetened beverages each day? (!) 2         6/12/2024   Exercise   Days per week of moderate/strenous exercise 3 days   Average minutes spent exercising at this level 60 min         6/12/2024   Social Factors   Frequency of gathering with  friends or relatives Once a week   Worry food won't last until get money to buy more No   Food not last or not have enough money for food? No   Do you have housing?  Yes   Are you worried about losing your housing? No   Lack of transportation? No   Unable to get utilities (heat,electricity)? No         6/12/2024   Dental   Dentist two times every year? (!) NO         6/12/2024   TB Screening   Were you born outside of the [place]? Yes       Today's PHQ-9 Score:       6/12/2024     2:36 PM   PHQ-9 SCORE   PHQ-9 Total Score MyChart 20 (Severe depression)   PHQ-9 Total Score 20         6/12/2024   Substance Use   Alcohol more than 3/day or more than 7/wk No   Do you use any other substances recreationally? No     Social History     Tobacco Use    Smoking status: Never    Smokeless tobacco: Never   Vaping Use    Vaping status: Never Used   Substance Use Topics    Alcohol use: No           2/21/2023   LAST FHS-7 RESULTS   1st degree relative breast or ovarian cancer Yes      Any relative bilateral breast cancer    Yes   Any male have breast cancer No    No   Any ONE woman have BOTH breast AND ovarian cancer No    No   Any woman with breast cancer before 50yrs No    No   2 or more relatives with breast AND/OR ovarian cancer No    No   2 or more relatives with breast AND/OR bowel cancer No    No       Mammogram Screening - Mammogram every 1-2 years updated in Health Maintenance based on mutual decision making        6/12/2024   STI Screening   New sexual partner(s) since last STI/HIV test? No     History of abnormal Pap smear: No - age 30-64 HPV with reflex Pap every 5 years recommended       ASCVD Risk   The 10-year ASCVD risk score (Gil SANCHEZ, et al., 2019) is: 0.5%    Values used to calculate the score:      Age: 44 years      Sex: Female      Is Non- : No      Diabetic: No      Tobacco smoker: No      Systolic Blood Pressure: 106 mmHg      Is BP treated: No      HDL Cholesterol: 54 mg/dL      " Total Cholesterol: 168 mg/dL        6/12/2024   Contraception/Family Planning   Questions about contraception or family planning No        Reviewed and updated as needed this visit by Provider   Tobacco   Meds  Problems  Med Hx  Surg Hx  Fam Hx            Past Medical History:   Diagnosis Date    Anxiety     Depression     Fibroid uterus     Iron deficiency      Past Surgical History:   Procedure Laterality Date    ESOPHAGOSCOPY, GASTROSCOPY, DUODENOSCOPY (EGD), COMBINED N/A 1/4/2018    Procedure: COMBINED ESOPHAGOSCOPY, GASTROSCOPY, DUODENOSCOPY (EGD);  EGD foreign body;  Surgeon: Marlyn Colindres MD;  Location:  GI     Lab work is in process      Review of Systems  Constitutional, HEENT, cardiovascular, pulmonary, gi and gu systems are negative, except as otherwise noted.     Objective    Exam  /67 (BP Location: Left arm, Patient Position: Sitting, Cuff Size: Adult Large)   Pulse 86   Temp 97.6  F (36.4  C) (Temporal)   Resp 18   Ht 1.75 m (5' 8.9\")   Wt 94.4 kg (208 lb 3.2 oz)   LMP 05/29/2024 (Approximate)   SpO2 97%   BMI 30.84 kg/m     Estimated body mass index is 30.84 kg/m  as calculated from the following:    Height as of this encounter: 1.75 m (5' 8.9\").    Weight as of this encounter: 94.4 kg (208 lb 3.2 oz).    Physical Exam  Vitals reviewed.   HENT:      Head: Normocephalic.      Right Ear: Tympanic membrane, ear canal and external ear normal.      Left Ear: Tympanic membrane, ear canal and external ear normal.   Eyes:      Pupils: Pupils are equal, round, and reactive to light.   Cardiovascular:      Rate and Rhythm: Normal rate and regular rhythm.      Pulses: Normal pulses.      Heart sounds: Normal heart sounds. No murmur heard.  Pulmonary:      Effort: Pulmonary effort is normal. No respiratory distress.      Breath sounds: Normal breath sounds. No wheezing, rhonchi or rales.   Abdominal:      General: Bowel sounds are normal. There is no distension.      " Palpations: Abdomen is soft. There is no mass.      Tenderness: There is no abdominal tenderness.   Musculoskeletal:         General: Normal range of motion.      Cervical back: Normal range of motion and neck supple.      Right lower leg: No edema.      Left lower leg: No edema.   Skin:     General: Skin is warm and dry.   Neurological:      Mental Status: She is alert and oriented to person, place, and time.   Psychiatric:         Mood and Affect: Mood normal.         Behavior: Behavior normal.               Signed Electronically by: Mireya Gonzales, DNP, APRN, CNP

## 2024-06-13 LAB
ALBUMIN SERPL BCG-MCNC: 4.1 G/DL (ref 3.5–5.2)
ALP SERPL-CCNC: 80 U/L (ref 40–150)
ALT SERPL W P-5'-P-CCNC: 29 U/L (ref 0–50)
ANION GAP SERPL CALCULATED.3IONS-SCNC: 11 MMOL/L (ref 7–15)
AST SERPL W P-5'-P-CCNC: 19 U/L (ref 0–45)
BILIRUB SERPL-MCNC: <0.2 MG/DL
BUN SERPL-MCNC: 9.5 MG/DL (ref 6–20)
CALCIUM SERPL-MCNC: 8.7 MG/DL (ref 8.6–10)
CHLORIDE SERPL-SCNC: 101 MMOL/L (ref 98–107)
CHOLEST SERPL-MCNC: 146 MG/DL
CREAT SERPL-MCNC: 0.72 MG/DL (ref 0.51–0.95)
DEPRECATED HCO3 PLAS-SCNC: 22 MMOL/L (ref 22–29)
EGFRCR SERPLBLD CKD-EPI 2021: >90 ML/MIN/1.73M2
FASTING STATUS PATIENT QL REPORTED: NO
FASTING STATUS PATIENT QL REPORTED: NO
FERRITIN SERPL-MCNC: 58 NG/ML (ref 6–175)
GLUCOSE SERPL-MCNC: 94 MG/DL (ref 70–99)
HDLC SERPL-MCNC: 46 MG/DL
IRON BINDING CAPACITY (ROCHE): 302 UG/DL (ref 240–430)
IRON SATN MFR SERPL: 13 % (ref 15–46)
IRON SERPL-MCNC: 39 UG/DL (ref 37–145)
LDLC SERPL CALC-MCNC: 64 MG/DL
NONHDLC SERPL-MCNC: 100 MG/DL
POTASSIUM SERPL-SCNC: 3.8 MMOL/L (ref 3.4–5.3)
PROT SERPL-MCNC: 6.8 G/DL (ref 6.4–8.3)
SODIUM SERPL-SCNC: 134 MMOL/L (ref 135–145)
TRIGL SERPL-MCNC: 181 MG/DL
TSH SERPL DL<=0.005 MIU/L-ACNC: 2.08 UIU/ML (ref 0.3–4.2)
VIT B12 SERPL-MCNC: 237 PG/ML (ref 232–1245)
VIT D+METAB SERPL-MCNC: 23 NG/ML (ref 20–50)

## 2024-06-14 ENCOUNTER — THERAPY VISIT (OUTPATIENT)
Dept: OCCUPATIONAL THERAPY | Facility: CLINIC | Age: 44
End: 2024-06-14
Payer: COMMERCIAL

## 2024-06-14 DIAGNOSIS — R41.3 MEMORY LOSS: ICD-10-CM

## 2024-06-14 DIAGNOSIS — R41.3 MEMORY CHANGES: Primary | ICD-10-CM

## 2024-06-14 LAB — BACTERIA UR CULT: ABNORMAL

## 2024-06-14 PROCEDURE — 97535 SELF CARE MNGMENT TRAINING: CPT | Mod: GO

## 2024-06-14 PROCEDURE — 97165 OT EVAL LOW COMPLEX 30 MIN: CPT | Mod: GO

## 2024-06-14 RX ORDER — LANOLIN ALCOHOL/MO/W.PET/CERES
1000 CREAM (GRAM) TOPICAL DAILY
Qty: 90 TABLET | Refills: 3 | Status: SHIPPED | OUTPATIENT
Start: 2024-06-14

## 2024-06-14 NOTE — PROGRESS NOTES
OCCUPATIONAL THERAPY EVALUATION  Type of Visit: Evaluation       Fall Risk Screen:  Fall screen completed by: OT  Have you fallen 2 or more times in the past year?: No  Have you fallen and had an injury in the past year?: No  Is patient a fall risk?: Department fall risk interventions implemented    Subjective      Presenting condition or subjective complaint: Memory problem  Date of onset: 06/12/24 (order date)    Relevant medical history: Anemia; Depression; Mental Illness; Overweight; Significant weakness   Dates & types of surgery: fibroid embolization january 2027    Prior diagnostic imaging/testing results:       Prior therapy history for the same diagnosis, illness or injury: No      Occupational Profile: Patient is a 44 year old female who was referred to outpatient occupational therapy due to memory concerns. Patient was seen by Mireya Gonzales DNP on 6/12/24. Patient is following [facility] for mental health management and recently diagnosed with ADHD. Past medical history significant for anxiety, depression, and chronic fatigue.     Prior Level of Function  Transfers: Independent  Ambulation: Independent  ADL: Independent  IADL: Driving, Finances, Housekeeping, Laundry, Meal preparation, School    Living Environment  Social support: With a significant other or spouse   Type of home: Apartment/condo   Stairs to enter the home: No       Ramp: Yes   Stairs inside the home: Yes   Is there a railing: No     Help at home: None  Equipment owned:       Employment: No    Hobbies/Interests: movies working out    Patient goals for therapy: Go to school and improve every day life    Pain assessment: Pain denied     Objective   Cognitive Status Examination  Orientation: Oriented to person, place and time   Level of Consciousness: Alert  Follows Commands and Answers Questions: 100% of the time  Personal Safety and Judgement: Intact  Memory:  reports memory issues impacting retention of information during school  Attention:   recently diagnosed with ADHD  Executive Function: Working memory impaired, decreased storage of information for performing tasks; recently diagnosed with ADHD  Contributing factors that may be impacting cognition include: anxiety, depression, and chronic fatigue     She endorses challenges with memory, most notably with retention of information in school. She was in chiropractor program but not currently completing due to depression and challenges with memory. She is currently completing pre-nursing at [employer] and on summer break. She has been diagnosed with ADHD. She reports depression, anxiety, and chronic fatigue. She is on medication and will be picking up ADHD medication. She follows psychiatry. She implements cognitive strategies including use of apple calendar      The Javier Cognitive Assessment (MoCA) is a brief screen of cognitive abilities used to detect cognitive impairments in the domains of visuospatial/executive functioning, naming, memory, attention, language, abstraction, and orientation. A score of 26 or above is considered normal with a total possible score of 30.  18-26 = mild cognitive impairment, 10-17= moderate cognitive impairment and less than 10= severe cognitive impairment. Results reveal 23/30 (-1 language, -5 delayed recall, -1 date).     VISUAL SKILLS  Visual Acuity: No deficits identified  Visual Field: Appears normal    SENSATION: UE Sensation WNL    POSTURE: WFL  RANGE OF MOTION: UE AROM WFL  STRENGTH: UE Strength WFL  MUSCLE TONE: WFL  COORDINATION: WFL  BALANCE: WFL    FUNCTIONAL MOBILITY  Assistive Device(s): none  Ambulation: IND    BED MOBILITY: Independent    TRANSFERS: Independent    BATHING: Independent    UPPER BODY DRESSING: Independent    LOWER BODY DRESSING: Independent    TOILETING: Independent    GROOMING: Independent    EATING/SELF FEEDING: Independent     ACTIVITY TOLERANCE: Ongoing fatigue with unknown etiology; seeing psychiatry for mental health  management. She states sleep more than she should. She has began going to the gym 4x and walking.     INSTRUMENTAL ACTIVITIES OF DAILY LIVING (IADL):   Meal Planning/Prep: Patient manages meal prep tasks but can fatigue. She is working on healthy eating healthy   Medications: occasionally forgets to   Schedule: challenges with getting to appointment times although she keeps a calendar  Home/Financial Management: She reports fatigues with household tasks.  manages finances  Community Mobility: Pt drives without reported issues    Assessment & Plan   CLINICAL IMPRESSIONS  Medical Diagnosis: Memory loss (R41.3)    Treatment Diagnosis: memory changes    Impression/Assessment: Patient is a 44 year old female who was referred to outpatient occupational therapy due to memory concerns. Patient was seen by Mireya Gonzales DNP on 6/12/24. Patient is following psych for mental health management and recently diagnosed with ADHD. The following significant findings have been identified:  memory concerns .  These identified deficits interfere with their ability to perform  school (retention of information), managing appointments  as compared to previous level of function.     Clinical Decision Making (Complexity):  Assessment of Occupational Performance: 1-3 Performance Deficits  Occupational Performance Limitations: school and managing appointments  Clinical Decision Making (Complexity): Low complexity    PLAN OF CARE  Treatment Interventions:  Interventions: Self-Care/Home Management, Therapeutic Activity    Long Term Goals   OT Goal 1  Goal Identifier: Memory/Attention  Goal Description: Pt will verbalize understanding about strategies to help with improving attention (concentration) and internal and external strategies to help with memory and recall into daily routine for improved ADL/IADL performance  Target Date: 07/12/24  OT Goal 2  Goal Identifier: Executive Function  Goal Description: Patient will learn and implement  2 executive function strategies to improve performance to 90% accy on a novel reasoning or problem solving task in order to support problem solving abilities for independent living.  Target Date: 07/12/24      Frequency of Treatment: 1x/week  Duration of Treatment: 4 weeks     Recommended Referrals to Other Professionals:  none at time of eval   Education Assessment: Learner/Method: Patient;Listening     Risks and benefits of evaluation/treatment have been explained.   Patient/Family/caregiver agrees with Plan of Care.     Evaluation Time:    OT Pete, Low Complexity Minutes (80948): 25    Signing Clinician: JOSE Addison, CNS, CSRS        [facility]                                                                                   OUTPATIENT OCCUPATIONAL THERAPY      PLAN OF TREATMENT FOR OUTPATIENT REHABILITATION   Patient's Last Name, First Name, BRIANTalibSUKUMARTalib  Vane Galicia  SALENA YOB: 1980   Provider's Name   [facility]   Medical Record No.  [id number]     Onset Date: 06/12/24 (order date) Start of Care Date: 06/14/24     Medical Diagnosis:  Memory loss (R41.3)      OT Treatment Diagnosis:  memory changes Plan of Treatment  Frequency/Duration:1x/week/4 weeks    Certification date from 06/14/24   To 07/12/24        See note for plan of treatment details and functional goals     JOSE Addison, CNS, CSRS                         I CERTIFY THE NEED FOR THESE SERVICES FURNISHED UNDER        THIS PLAN OF TREATMENT AND WHILE UNDER MY CARE     (Physician attestation of this document indicates review and certification of the therapy plan).              Referring Provider:  Mireya Gonzales    Initial Assessment  See Epic Evaluation- 06/14/24

## 2024-06-17 ENCOUNTER — TELEPHONE (OUTPATIENT)
Dept: FAMILY MEDICINE | Facility: CLINIC | Age: 44
End: 2024-06-17
Payer: COMMERCIAL

## 2024-06-17 NOTE — TELEPHONE ENCOUNTER
Called patient regarding PCP message below. She verbalized understanding. Sent message to [facility] per patient request as well.

## 2024-06-17 NOTE — TELEPHONE ENCOUNTER
"Mireya Gonzales DNP  [facility] - Primary Care  Hi team, please call patient and inform her of her results per her request. Thank you!    Hi Vane,    -Your comprehensive metabolic panel which includes tests of liver function (ALT, AST, total bilirubin, alkaline phosphatase), kidney function (creatinine, BUN), electrolytes (sodium, potassium, calcium), and blood sugar (glucose) was within normal range.    -Cholesterol levels showed some elevated triglycerides but they are acceptable for a non-fasting state. The \"bad\" cholesterol number looks great.    -Your vitamin B12 levels are on the low side of \"normal.\" I recommend taking vitamin B12 1,000 mg daily and rechecking the level in three months.  I have sent this prescription to your pharmacy and placed a lab order to recheck levels at that time.    -Normal circulating thyroid hormone    -Low vitamin d levels. I'd recommend 2,000 international unit(s) daily. This can be purchased OTC.    -Normal hemoglobin A1c - no evidence to suggest diabetes.    -Normal blood count - no signs of anemia. However, some of your iron studies may reveal a mild iron deficiency.  I would recommend starting \"Slow Fe\" iron supplement over-the-counter 2-3x/week. We can repeat these in 3 months with your next blood draw. Consuming iron with vitamin C (ex: orange juice) can improve absorption.      Please schedule a lab only visit to have some of these rechecked.    Let me know if you have any questions or concerns,  Mireya Gonzales DNP, APRN, CNP  "

## 2024-06-18 ENCOUNTER — OFFICE VISIT (OUTPATIENT)
Dept: URGENT CARE | Facility: URGENT CARE | Age: 44
End: 2024-06-18
Payer: COMMERCIAL

## 2024-06-18 ENCOUNTER — ANCILLARY PROCEDURE (OUTPATIENT)
Dept: CT IMAGING | Facility: CLINIC | Age: 44
End: 2024-06-18
Attending: PREVENTIVE MEDICINE
Payer: COMMERCIAL

## 2024-06-18 ENCOUNTER — OFFICE VISIT (OUTPATIENT)
Dept: PEDIATRICS | Facility: CLINIC | Age: 44
End: 2024-06-18
Payer: COMMERCIAL

## 2024-06-18 VITALS
WEIGHT: 197.8 LBS | HEART RATE: 87 BPM | OXYGEN SATURATION: 97 % | DIASTOLIC BLOOD PRESSURE: 70 MMHG | RESPIRATION RATE: 18 BRPM | BODY MASS INDEX: 29.3 KG/M2 | TEMPERATURE: 98 F | SYSTOLIC BLOOD PRESSURE: 117 MMHG

## 2024-06-18 VITALS
HEIGHT: 69 IN | RESPIRATION RATE: 18 BRPM | DIASTOLIC BLOOD PRESSURE: 79 MMHG | WEIGHT: 197 LBS | SYSTOLIC BLOOD PRESSURE: 113 MMHG | TEMPERATURE: 97.4 F | BODY MASS INDEX: 29.18 KG/M2 | OXYGEN SATURATION: 98 % | HEART RATE: 81 BPM

## 2024-06-18 DIAGNOSIS — R19.7 DIARRHEA IN ADULT PATIENT: ICD-10-CM

## 2024-06-18 DIAGNOSIS — R11.2 NAUSEA AND VOMITING, UNSPECIFIED VOMITING TYPE: Primary | ICD-10-CM

## 2024-06-18 DIAGNOSIS — R19.7 DIARRHEA, UNSPECIFIED TYPE: ICD-10-CM

## 2024-06-18 DIAGNOSIS — K52.9 GASTROENTERITIS: Primary | ICD-10-CM

## 2024-06-18 DIAGNOSIS — N83.201 CYST OF RIGHT OVARY: ICD-10-CM

## 2024-06-18 DIAGNOSIS — R10.84 ABDOMINAL PAIN, GENERALIZED: ICD-10-CM

## 2024-06-18 DIAGNOSIS — E87.6 HYPOKALEMIA: ICD-10-CM

## 2024-06-18 DIAGNOSIS — R31.0 GROSS HEMATURIA: ICD-10-CM

## 2024-06-18 DIAGNOSIS — R11.10 VOMITING, UNSPECIFIED VOMITING TYPE, UNSPECIFIED WHETHER NAUSEA PRESENT: ICD-10-CM

## 2024-06-18 DIAGNOSIS — R91.1 PULMONARY NODULE: ICD-10-CM

## 2024-06-18 LAB
ALBUMIN SERPL BCG-MCNC: 4.1 G/DL (ref 3.5–5.2)
ALBUMIN UR-MCNC: ABNORMAL MG/DL
ALP SERPL-CCNC: 80 U/L (ref 40–150)
ALT SERPL W P-5'-P-CCNC: 33 U/L (ref 0–50)
ANION GAP SERPL CALCULATED.3IONS-SCNC: 15 MMOL/L (ref 7–15)
APPEARANCE UR: CLEAR
AST SERPL W P-5'-P-CCNC: 27 U/L (ref 0–45)
BACTERIA #/AREA URNS HPF: ABNORMAL /HPF
BASOPHILS # BLD AUTO: 0 10E3/UL (ref 0–0.2)
BASOPHILS NFR BLD AUTO: 0 %
BILIRUB SERPL-MCNC: 0.2 MG/DL
BILIRUB UR QL STRIP: ABNORMAL
BUN SERPL-MCNC: 8.1 MG/DL (ref 6–20)
CALCIUM SERPL-MCNC: 8.9 MG/DL (ref 8.6–10)
CHLORIDE SERPL-SCNC: 102 MMOL/L (ref 98–107)
CLUE CELLS: ABNORMAL
COLOR UR AUTO: YELLOW
CREAT SERPL-MCNC: 0.79 MG/DL (ref 0.51–0.95)
DEPRECATED HCO3 PLAS-SCNC: 19 MMOL/L (ref 22–29)
EGFRCR SERPLBLD CKD-EPI 2021: >90 ML/MIN/1.73M2
EOSINOPHIL # BLD AUTO: 0.1 10E3/UL (ref 0–0.7)
EOSINOPHIL NFR BLD AUTO: 1 %
ERYTHROCYTE [DISTWIDTH] IN BLOOD BY AUTOMATED COUNT: 13.4 % (ref 10–15)
GLUCOSE SERPL-MCNC: 94 MG/DL (ref 70–99)
GLUCOSE UR STRIP-MCNC: NEGATIVE MG/DL
HCG UR QL: NEGATIVE
HCG UR QL: NEGATIVE
HCT VFR BLD AUTO: 44.3 % (ref 35–47)
HGB BLD-MCNC: 14.4 G/DL (ref 11.7–15.7)
HGB UR QL STRIP: NEGATIVE
IMM GRANULOCYTES # BLD: 0 10E3/UL
IMM GRANULOCYTES NFR BLD: 0 %
KETONES UR STRIP-MCNC: 80 MG/DL
LEUKOCYTE ESTERASE UR QL STRIP: ABNORMAL
LIPASE SERPL-CCNC: 22 U/L (ref 13–60)
LYMPHOCYTES # BLD AUTO: 1.7 10E3/UL (ref 0.8–5.3)
LYMPHOCYTES NFR BLD AUTO: 28 %
MCH RBC QN AUTO: 27.5 PG (ref 26.5–33)
MCHC RBC AUTO-ENTMCNC: 32.5 G/DL (ref 31.5–36.5)
MCV RBC AUTO: 85 FL (ref 78–100)
MONOCYTES # BLD AUTO: 0.7 10E3/UL (ref 0–1.3)
MONOCYTES NFR BLD AUTO: 12 %
NEUTROPHILS # BLD AUTO: 3.5 10E3/UL (ref 1.6–8.3)
NEUTROPHILS NFR BLD AUTO: 58 %
NITRATE UR QL: NEGATIVE
PH UR STRIP: 6 [PH] (ref 5–7)
PLATELET # BLD AUTO: 354 10E3/UL (ref 150–450)
POTASSIUM SERPL-SCNC: 3.3 MMOL/L (ref 3.4–5.3)
PROT SERPL-MCNC: 7.2 G/DL (ref 6.4–8.3)
RBC # BLD AUTO: 5.24 10E6/UL (ref 3.8–5.2)
RBC #/AREA URNS AUTO: ABNORMAL /HPF
SODIUM SERPL-SCNC: 136 MMOL/L (ref 135–145)
SP GR UR STRIP: 1.02 (ref 1–1.03)
SQUAMOUS #/AREA URNS AUTO: ABNORMAL /LPF
TRICHOMONAS, WET PREP: ABNORMAL
UROBILINOGEN UR STRIP-ACNC: 0.2 E.U./DL
WBC # BLD AUTO: 6 10E3/UL (ref 4–11)
WBC #/AREA URNS AUTO: ABNORMAL /HPF
WBC'S/HIGH POWER FIELD, WET PREP: ABNORMAL
YEAST, WET PREP: ABNORMAL

## 2024-06-18 PROCEDURE — 250N000009 HC RX 250: Performed by: PREVENTIVE MEDICINE

## 2024-06-18 PROCEDURE — 81025 URINE PREGNANCY TEST: CPT | Mod: 59 | Performed by: EMERGENCY MEDICINE

## 2024-06-18 PROCEDURE — 99207 REFERRAL TO ACUTE AND DIAGNOSTIC SERVICES: CPT | Performed by: EMERGENCY MEDICINE

## 2024-06-18 PROCEDURE — 80053 COMPREHEN METABOLIC PANEL: CPT | Performed by: PREVENTIVE MEDICINE

## 2024-06-18 PROCEDURE — 81001 URINALYSIS AUTO W/SCOPE: CPT | Performed by: EMERGENCY MEDICINE

## 2024-06-18 PROCEDURE — 83690 ASSAY OF LIPASE: CPT | Performed by: PREVENTIVE MEDICINE

## 2024-06-18 PROCEDURE — 250N000011 HC RX IP 250 OP 636: Performed by: PREVENTIVE MEDICINE

## 2024-06-18 PROCEDURE — 96361 HYDRATE IV INFUSION ADD-ON: CPT | Performed by: PREVENTIVE MEDICINE

## 2024-06-18 PROCEDURE — 99417 PROLNG OP E/M EACH 15 MIN: CPT | Performed by: PREVENTIVE MEDICINE

## 2024-06-18 PROCEDURE — 85025 COMPLETE CBC W/AUTO DIFF WBC: CPT | Performed by: PREVENTIVE MEDICINE

## 2024-06-18 PROCEDURE — 36415 COLL VENOUS BLD VENIPUNCTURE: CPT | Performed by: PREVENTIVE MEDICINE

## 2024-06-18 PROCEDURE — 99215 OFFICE O/P EST HI 40 MIN: CPT | Mod: 25 | Performed by: PREVENTIVE MEDICINE

## 2024-06-18 PROCEDURE — 74177 CT ABD & PELVIS W/CONTRAST: CPT

## 2024-06-18 PROCEDURE — 96374 THER/PROPH/DIAG INJ IV PUSH: CPT | Performed by: PREVENTIVE MEDICINE

## 2024-06-18 PROCEDURE — 87210 SMEAR WET MOUNT SALINE/INK: CPT | Performed by: EMERGENCY MEDICINE

## 2024-06-18 PROCEDURE — 81025 URINE PREGNANCY TEST: CPT | Performed by: EMERGENCY MEDICINE

## 2024-06-18 RX ORDER — POTASSIUM CHLORIDE 1500 MG/1
40 TABLET, EXTENDED RELEASE ORAL ONCE
Status: COMPLETED | OUTPATIENT
Start: 2024-06-18 | End: 2024-06-18

## 2024-06-18 RX ORDER — ONDANSETRON 4 MG/1
4 TABLET, ORALLY DISINTEGRATING ORAL EVERY 8 HOURS PRN
Qty: 20 TABLET | Refills: 0 | Status: SHIPPED | OUTPATIENT
Start: 2024-06-18

## 2024-06-18 RX ORDER — CEFTRIAXONE 1 G/1
1 INJECTION, POWDER, FOR SOLUTION INTRAMUSCULAR; INTRAVENOUS ONCE
Status: COMPLETED | OUTPATIENT
Start: 2024-06-18 | End: 2024-06-18

## 2024-06-18 RX ORDER — ONDANSETRON 2 MG/ML
4 INJECTION INTRAMUSCULAR; INTRAVENOUS ONCE
Status: COMPLETED | OUTPATIENT
Start: 2024-06-18 | End: 2024-06-18

## 2024-06-18 RX ORDER — IOPAMIDOL 755 MG/ML
90 INJECTION, SOLUTION INTRAVASCULAR ONCE
Status: COMPLETED | OUTPATIENT
Start: 2024-06-18 | End: 2024-06-18

## 2024-06-18 RX ADMIN — ONDANSETRON 4 MG: 2 INJECTION INTRAMUSCULAR; INTRAVENOUS at 11:45

## 2024-06-18 RX ADMIN — Medication 1000 ML: at 12:24

## 2024-06-18 RX ADMIN — POTASSIUM CHLORIDE 40 MEQ: 1500 TABLET, EXTENDED RELEASE ORAL at 13:25

## 2024-06-18 RX ADMIN — SODIUM CHLORIDE 40 ML: 9 INJECTION, SOLUTION INTRAVENOUS at 12:00

## 2024-06-18 RX ADMIN — IOPAMIDOL 90 ML: 755 INJECTION, SOLUTION INTRAVENOUS at 12:01

## 2024-06-18 RX ADMIN — CEFTRIAXONE 1 G: 1 INJECTION, POWDER, FOR SOLUTION INTRAMUSCULAR; INTRAVENOUS at 13:25

## 2024-06-18 ASSESSMENT — PAIN SCALES - GENERAL: PAINLEVEL: NO PAIN (0)

## 2024-06-18 NOTE — PATIENT INSTRUCTIONS
Thanks for choosing the [facility] for your care today.  You were seen for gastroenteritis (stomach flu)    I advise a bland (BRAT - bananas, rice, apple sauce, toast) diet.  Push fluids.  Use zofran as needed for nausea.  Return stool samples to lab.    You have stable lung nodule noted on CT imaging.   Discuss follow up with your pcp.    You also have an right ovarian cyst noted on the CT scan.  It is recommended to do a pelvic ultrasound in follow up.

## 2024-06-18 NOTE — PROGRESS NOTES
"[facility] Visit    Assessment & Plan     (E87.6) Hypokalemia  (K52.9) Gastroenteritis  (primary encounter diagnosis)  (N83.201) Cyst of right ovary  (R91.1) Pulmonary nodule    Patient was given 1L NS, 4 mg IV zofran and felt better  Also 40 meq oral potassium    [place] (BRAT - bananas, rice, apple sauce, toast) diet.  Push fluids.  Use zofran as needed for nausea.  Return stool samples to lab.    You have stable lung nodule noted on CT imaging.   Discuss follow up imaging with your pcp.    You also have an right ovarian cyst noted on the CT scan.  It is recommended to do a pelvic ultrasound in follow up.  Follow up with pcp in 2 weeks or sooner as needed      84  minutes were spent doing chart review, history and exam, documentation and further activities per the note.        See Patient Instructions    No follow-ups on file.    Melia Cifuentes is a 44 year old, presenting for the following health issues:  Nausea, Vomiting, & Diarrhea    HPI     Diarrhea  Onset/Duration: Friday night  Description:       Consistency of stool: watery currently and loose at first        Blood in stool: unsure       Number of loose stools past 24 hours: \"a lot\"  Progression of Symptoms: worsening  Accompanying signs and symptoms:       Fever: No, shivering 2 nights ago       Nausea/Vomiting: YES       Abdominal pain: a couple times on the right side        Weight loss: YES- 10lbs       Episodes of constipation: No  History   Ill contacts: No - recent gathering Friday night rice plums chicken  Recent use of antibiotics: YES  Recent travels: No  Recent medication-new or changes(Rx or OTC): YES- flagyl, cefdinir   Precipitating or alleviating factors:   Therapies tried and outcome: Flagyl and cefdinir    Nausea/Vomiting/Dehydration  Onset/Duration: saturday       Nausea: YES       Vomiting:  YES       How many times in the last 24 hours:  a lot 5-6 times       What is the appearance:  watery       Any blood " "present: no        Diarrhea: YES       Constipation: no        Melena/dark stools: no        What has oral intake been like: loss of appetite        Abdominal pain: some  Risks       Recent travel: no        Exposures to ill contacts: unsure       Recent antibiotics past 2 months: YES       Other:   Progression of symptoms: worsening  Accompanying signs and symptoms:       Fever/chills: YES- shivering 2 nights ago       Gas/bloating: no        Dysuria or hematuria: YES- pt thinks it might be to her menstrual cycle       Other symptoms: YES- fatigue  Therapies tried and outcome:     This is a  45 yo female who was seen on 6/12/24 in the urgent care and diagnosed with uti and bv.  Treated with Omnicef and Flagyl.  Then developed n/v/d starting on 6/14/24.  Some abdominal cramping.  No fever or chills.  No blood in stool.  Pain with urination has improved.  No back or flank pain.        Review of Systems  Constitutional, HEENT, cardiovascular, pulmonary, GI, , musculoskeletal, neuro, skin, endocrine and psych systems are negative, except as otherwise noted.      Objective    /79 (BP Location: Left arm, Patient Position: Sitting, Cuff Size: Adult Large)   Pulse 81   Temp 97.4  F (36.3  C)   Resp 18   Ht 1.75 m (5' 8.9\")   Wt 89.4 kg (197 lb)   LMP 05/29/2024 (Approximate)   SpO2 98%   BMI 29.18 kg/m    Body mass index is 29.18 kg/m .  Physical Exam   GENERAL: alert and no distress  EYES: Eyes grossly normal to inspection, PERRL and conjunctivae and sclerae normal  HENT: ear canals and TM's normal, nose and mouth without ulcers or lesions  NECK: no adenopathy, no asymmetry, masses, or scars  RESP: lungs clear to auscultation - no rales, rhonchi or wheezes  CV: regular rate and rhythm, normal S1 S2, no S3 or S4, no murmur, click or rub, no peripheral edema  ABDOMEN: soft, nontender, no hepatosplenomegaly, no masses and bowel sounds normal  MS: no gross musculoskeletal defects noted, no edema  SKIN: no " suspicious lesions or rashes  NEURO: Normal strength and tone, mentation intact and speech normal  PSYCH: mentation appears normal, affect normal/bright    Results for orders placed or performed in visit on 06/18/24   CT Abdomen Pelvis w Contrast     Status: None    Narrative    EXAM: CT ABDOMEN PELVIS W CONTRAST  LOCATION: [facility]  DATE: 6/18/2024    INDICATION:  Vomiting, unspecified vomiting type, unspecified whether nausea present, Diarrhea, unspecified type, Abdominal pain, generalized  COMPARISON: Chest CT 5/5/2023.  TECHNIQUE: CT scan of the abdomen and pelvis was performed following injection of IV contrast. Multiplanar reformats were obtained. Dose reduction techniques were used.  CONTRAST: 90ml isovue 370    FINDINGS:   LOWER CHEST: A subpleural right lower lobe nodule measures 6 x 3 mm (average diameter 5 mm, series 3, image 6) and is stable.    HEPATOBILIARY: Possible hepatic steatosis. No suspicious liver lesion. Normal gallbladder and bile ducts.    PANCREAS: Normal.    SPLEEN: Normal.    ADRENAL GLANDS: Normal.    KIDNEYS/BLADDER: Normal.    BOWEL: Nondistended. No abnormal bowel wall enhancement or thickening. There is liquid stool throughout the colon, in keeping with the reported diarrheal illness. Normal appendix.    LYMPH NODES: There are a few mildly enlarged to prominent lymph nodes within the central mesenteric leaflet such as 2 10 mm nodes (series 3, images 72 and 76). No retroperitoneal lymphadenopathy.    VASCULATURE: Normal.    PELVIC ORGANS: There are multiple uterine fibroids with the largest measuring 10.4 x 9.6 x 10.4 cm which displaces and exerts mass effect upon the myometrium. The endometrial stripe is also not distinct due to the mass effect from the uterine fibroid.   The right ovary is not distinct but is probably also displaced and located posteriorly within the pelvis containing a 2.6 cm dominant follicle/cyst. Normal left ovary. No  ascites.    MUSCULOSKELETAL: No aggressive osseous lesion. Small fat-containing umbilical hernia.          Impression    IMPRESSION:   1.  Liquid stool throughout the colon, in keeping with the reported diarrheal illness. No imaging findings to suggest enterocolitis.  2.  Few mildly enlarged to prominent central mesenteric lymph nodes may be reactive but warrant follow-up CT in 3 months.  3.  A large 10.4 cm uterine fibroid exerts mass within the pelvis. The right ovary is not well seen and is probably displaced posteriorly. Recommend further evaluation with pelvic sonography.  4.  Stable right lower lobe nodule. Follow-up can be performed. The previously recommended guidelines.          REFERENCE:  Guidelines for Management of Incidental Pulmonary Nodules Detected on CT Images: From the Fleischner Society 2017.   Guidelines apply to incidental nodules in patients who are 35 years or older.  Guidelines do not apply to lung cancer screening, patients with immunosuppression, or patients with known primary cancer.    SINGLE NODULE  Nodule size <6 mm  Low-risk patients: No follow-up needed.  High-risk patients: Optional follow-up at 12 months.       Results for orders placed or performed in visit on 06/18/24   Comprehensive metabolic panel     Status: Abnormal   Result Value Ref Range    Sodium 136 135 - 145 mmol/L    Potassium 3.3 (L) 3.4 - 5.3 mmol/L    Carbon Dioxide (CO2) 19 (L) 22 - 29 mmol/L    Anion Gap 15 7 - 15 mmol/L    Urea Nitrogen 8.1 6.0 - 20.0 mg/dL    Creatinine 0.79 0.51 - 0.95 mg/dL    GFR Estimate >90 >60 mL/min/1.73m2    Calcium 8.9 8.6 - 10.0 mg/dL    Chloride 102 98 - 107 mmol/L    Glucose 94 70 - 99 mg/dL    Alkaline Phosphatase 80 40 - 150 U/L    AST 27 0 - 45 U/L    ALT 33 0 - 50 U/L    Protein Total 7.2 6.4 - 8.3 g/dL    Albumin 4.1 3.5 - 5.2 g/dL    Bilirubin Total 0.2 <=1.2 mg/dL   Lipase     Status: Normal   Result Value Ref Range    Lipase 22 13 - 60 U/L   CBC with platelets and  differential     Status: Abnormal   Result Value Ref Range    WBC Count 6.0 4.0 - 11.0 10e3/uL    RBC Count 5.24 (H) 3.80 - 5.20 10e6/uL    Hemoglobin 14.4 11.7 - 15.7 g/dL    Hematocrit 44.3 35.0 - 47.0 %    MCV 85 78 - 100 fL    MCH 27.5 26.5 - 33.0 pg    MCHC 32.5 31.5 - 36.5 g/dL    RDW 13.4 10.0 - 15.0 %    Platelet Count 354 150 - 450 10e3/uL    % Neutrophils 58 %    % Lymphocytes 28 %    % Monocytes 12 %    % Eosinophils 1 %    % Basophils 0 %    % Immature Granulocytes 0 %    Absolute Neutrophils 3.5 1.6 - 8.3 10e3/uL    Absolute Lymphocytes 1.7 0.8 - 5.3 10e3/uL    Absolute Monocytes 0.7 0.0 - 1.3 10e3/uL    Absolute Eosinophils 0.1 0.0 - 0.7 10e3/uL    Absolute Basophils 0.0 0.0 - 0.2 10e3/uL    Absolute Immature Granulocytes 0.0 <=0.4 10e3/uL   CBC with platelets differential     Status: Abnormal    Narrative    The following orders were created for panel order CBC with platelets differential.  Procedure                               Abnormality         Status                     ---------                               -----------         ------                     CBC with platelets and d...[[id number]]  Abnormal            Final result                 Please view results for these tests on the individual orders.   Results for orders placed or performed in visit on 06/18/24   UA Macroscopic with reflex to Microscopic and Culture - Clinic Collect     Status: Abnormal    Specimen: Urine, Clean Catch   Result Value Ref Range    Color Urine Yellow Colorless, Straw, Light Yellow, Yellow    Appearance Urine Clear Clear    Glucose Urine Negative Negative mg/dL    Bilirubin Urine Small (A) Negative    Ketones Urine 80 (A) Negative mg/dL    Specific Gravity Urine 1.020 1.003 - 1.035    Blood Urine Negative Negative    pH Urine 6.0 5.0 - 7.0    Protein Albumin Urine Trace (A) Negative mg/dL    Urobilinogen Urine 0.2 0.2, 1.0 E.U./dL    Nitrite Urine Negative Negative    Leukocyte Esterase Urine Trace (A) Negative    HCG qualitative urine     Status: Normal   Result Value Ref Range    hCG Urine Qualitative Negative Negative   UA Microscopic with Reflex to Culture     Status: Abnormal   Result Value Ref Range    Bacteria Urine Moderate (A) None Seen /HPF    RBC Urine 0-2 0-2 /HPF /HPF    WBC Urine 5-10 (A) 0-5 /HPF /HPF    Squamous Epithelials Urine Moderate (A) None Seen /LPF    Narrative    Urine Culture not indicated   HCG qualitative urine     Status: Normal   Result Value Ref Range    hCG Urine Qualitative Negative Negative   Wet prep - Clinic Collect     Status: Abnormal    Specimen: Vagina; Swab   Result Value Ref Range    Trichomonas Absent Absent    Yeast Absent Absent    Clue Cells Absent Absent    WBCs/high power field 2+ (A) None             Signed Electronically by: David Riley MD

## 2024-06-18 NOTE — PROGRESS NOTES
Assessment & Plan     Diagnosis:    ICD-10-CM    1. Nausea and vomiting, unspecified vomiting type  R11.2 HCG qualitative urine     HCG qualitative urine     Referral to Acute and Diagnostic Services (Day of diagnostic / First order acute)      2. Hematuria  R31.9 UA Macroscopic with reflex to Microscopic and Culture - Clinic Collect     UA Microscopic with Reflex to Culture     Referral to Acute and Diagnostic Services (Day of diagnostic / First order acute)      3. Diarrhea in adult patient  R19.7 UA Macroscopic with reflex to Microscopic and Culture - Clinic Collect     UA Microscopic with Reflex to Culture     Referral to Acute and Diagnostic Services (Day of diagnostic / First order acute)          Medical Decision Making:  Vane Galicia is a 44 year old female who presents for evaluation of nausea, vomiting and diarrhea with mild abdominal pain in a nonfocal abdominal exam. Recent on Cefdinir and Flagyl for UTI/BV. No ill contacts. I considered a broad differential diagnosis for this patient including viral gastroenteritis, bacterial infection of the large intestine (salmonella, shigella, campylobacter, e coli, etc), bowel obstruction, intra-abdominal infection such as colitis, food poisoning, cholecystitis, UTI, pyelonephritis, appendicitis, etc.  There are no signs of worrisome intra-abdominal pathologies detected during the visit today.  Patient has lost 10 pounds in the last week and appears dehydrated. She is not keeping her antibiotics down, UA without overt signs of infection but I discussed this could represent ongoing urinary tract infection.  I directed her to the [facility] now for further evaluation, IV fluids and work up.  Questions answered.    Referral to Acute and Diagnostic Services    [phone] ([facility]) [facility] [address] [facility], [address], [place] [address]    Transition to Acute & Diagnostic Services Clinic has been discussed with patient, and she agrees with next level of care.    Patient understands that evaluation/treatment at [facility] typically takes significantly longer than in clinic/urgent care (>2 hours).  The [facility] Acute and Diagnostics Services Clinic has been contacted by provider/staff to confirm patient acceptance.         Special issues:      None                     The following provider has assessed this patient for intervention at [facility], and directed the patient for referral: AKILAH Kelly PA-C  [facility] U[facility]    Subjective     Vane Galicia is a 44 year old female who presents to clinic today for the following health issues:  Chief Complaint   Patient presents with    Urgent Care    Nausea, Vomiting, & Diarrhea     Seen 6/12/24 Rx Metrodiazole and Cefdinir  -Pt reports extreme nausea, vomiting since Friday, no energy and loss about 10 lbs  Noticed blood in urine and discharge x 4 days       HPI  Patient reports 4 days of nausea, vomiting, diarrhea, about 10+ episodes a day of both.  She noticed some blood in her urine continuing as well as discharge for the last few days, was seen for UTI last week and started on metronidazole and cefdinir.  She notes that she has been trying to keep the antibiotics down but has not really been able to.  She is lost about 10 pounds.  She recently had some right-sided abdominal pain, none today.  She denies any fevers, back or flank pain, dark or bloody stools, recent travel, ill contacts or other concerns.    Review of Systems    See HPI    Objective      Vitals: /70 (BP Location: Left arm, Patient Position: Sitting, Cuff Size: Adult Large)   Pulse 87   Temp 98  F (36.7  C) (Oral)   Resp 18   Wt 89.7 kg (197 lb 12.8 oz)   LMP 05/29/2024 (Approximate)   SpO2 97%   Breastfeeding No   BMI 29.30 kg/m      Patient Vitals for the past 24 hrs:   BP Temp Temp src Pulse Resp SpO2 Weight   06/18/24 1005 117/70 98  F (36.7  C) Oral 87 18 97 % 89.7 kg (197 lb 12.8 oz)       Vital  signs reviewed by: Rio Fox PA-C    Physical Exam   Constitutional: Patient is alert and cooperative. Mild acute distress.  Mouth: Mucous membranes are tacky. Normal tongue and tonsil. Posterior oropharynx is clear.  Cardiovascular: Regular rate and rhythm  Pulmonary/Chest: Lungs are clear to auscultation throughout. Effort normal. No respiratory distress. No wheezes, rales or rhonchi.  GI: Abdomen is soft and non-tender throughout. No CVA tenderness bilaterally.  Neurological: Alert and oriented x3.   Skin: No rash noted on visualized skin.  Psychiatric:The patient has a normal mood and affect.     Labs/Imaging:  Results for orders placed or performed in visit on 06/18/24   UA Macroscopic with reflex to Microscopic and Culture - Clinic Collect     Status: Abnormal    Specimen: Urine, Clean Catch   Result Value Ref Range    Color Urine Yellow Colorless, Straw, Light Yellow, Yellow    Appearance Urine Clear Clear    Glucose Urine Negative Negative mg/dL    Bilirubin Urine Small (A) Negative    Ketones Urine 80 (A) Negative mg/dL    Specific Gravity Urine 1.020 1.003 - 1.035    Blood Urine Negative Negative    pH Urine 6.0 5.0 - 7.0    Protein Albumin Urine Trace (A) Negative mg/dL    Urobilinogen Urine 0.2 0.2, 1.0 E.U./dL    Nitrite Urine Negative Negative    Leukocyte Esterase Urine Trace (A) Negative   HCG qualitative urine     Status: Normal   Result Value Ref Range    hCG Urine Qualitative Negative Negative   UA Microscopic with Reflex to Culture     Status: Abnormal   Result Value Ref Range    Bacteria Urine Moderate (A) None Seen /HPF    RBC Urine 0-2 0-2 /HPF /HPF    WBC Urine 5-10 (A) 0-5 /HPF /HPF    Squamous Epithelials Urine Moderate (A) None Seen /LPF    Narrative    Urine Culture not indicated   Wet prep - Clinic Collect     Status: Abnormal    Specimen: Vagina; Swab   Result Value Ref Range    Trichomonas Absent Absent    Yeast Absent Absent    Clue Cells Absent Absent    WBCs/high power field  2+ (A) None       Rio Fox PA-C, June 18, 2024

## 2024-06-19 ENCOUNTER — TELEPHONE (OUTPATIENT)
Dept: FAMILY MEDICINE | Facility: CLINIC | Age: 44
End: 2024-06-19

## 2024-06-19 DIAGNOSIS — R93.5 ABNORMAL CT OF THE ABDOMEN: Primary | ICD-10-CM

## 2024-06-19 NOTE — TELEPHONE ENCOUNTER
Pt has pelvic issues and would like to be seen sooner than later. I suggested UC during the day, Mon-Fri.. Then, if medically necessary, she could receive and U/S through [facility].I informed her she could come between 10 AM and 3PM. Pt said she would try to come in by Monday.  Also, seeing an OB/GYN would also be a good direction.

## 2024-06-19 NOTE — TELEPHONE ENCOUNTER
Order/Referral Request    Who is requesting: the patient    Orders being requested: order for Pelvic Ultrasound    Reason service is needed/diagnosis: overy    When are orders needed by: as soon as possible    Has this been discussed with Provider: Yes    Does patient have a preference on a Group/Provider/Facility? [facility]    Does patient have an appointment scheduled?: No, need the order    Where to send orders: Fax    Could we send this information to you in [facility] or would you prefer to receive a phone call?:   Patient would prefer a phone call   Okay to leave a detailed message?: Yes at Cell number on file:    Telephone Information:   Mobile [phone]     Please call the patient once this request is complete so she know.  Thank you.

## 2024-06-23 ENCOUNTER — APPOINTMENT (OUTPATIENT)
Dept: GENERAL RADIOLOGY | Facility: CLINIC | Age: 44
End: 2024-06-23
Attending: STUDENT IN AN ORGANIZED HEALTH CARE EDUCATION/TRAINING PROGRAM
Payer: COMMERCIAL

## 2024-06-23 ENCOUNTER — HOSPITAL ENCOUNTER (EMERGENCY)
Facility: CLINIC | Age: 44
Discharge: HOME | End: 2024-06-23
Attending: STUDENT IN AN ORGANIZED HEALTH CARE EDUCATION/TRAINING PROGRAM | Admitting: STUDENT IN AN ORGANIZED HEALTH CARE EDUCATION/TRAINING PROGRAM
Payer: COMMERCIAL

## 2024-06-23 VITALS
HEIGHT: 69 IN | RESPIRATION RATE: 16 BRPM | HEART RATE: 85 BPM | TEMPERATURE: 97.9 F | DIASTOLIC BLOOD PRESSURE: 63 MMHG | SYSTOLIC BLOOD PRESSURE: 129 MMHG | WEIGHT: 190 LBS | OXYGEN SATURATION: 99 % | BODY MASS INDEX: 28.14 KG/M2

## 2024-06-23 DIAGNOSIS — M79.671 RIGHT FOOT PAIN: ICD-10-CM

## 2024-06-23 PROCEDURE — 73630 X-RAY EXAM OF FOOT: CPT | Mod: RT

## 2024-06-23 PROCEDURE — 250N000013 HC RX MED GY IP 250 OP 250 PS 637: Performed by: STUDENT IN AN ORGANIZED HEALTH CARE EDUCATION/TRAINING PROGRAM

## 2024-06-23 PROCEDURE — 99283 EMERGENCY DEPT VISIT LOW MDM: CPT

## 2024-06-23 RX ORDER — IBUPROFEN 600 MG/1
600 TABLET, FILM COATED ORAL ONCE
Status: COMPLETED | OUTPATIENT
Start: 2024-06-23 | End: 2024-06-23

## 2024-06-23 RX ORDER — COLCHICINE 0.6 MG/1
0.6 TABLET ORAL ONCE
Status: DISCONTINUED | OUTPATIENT
Start: 2024-06-23 | End: 2024-06-23 | Stop reason: HOSPADM

## 2024-06-23 RX ORDER — COLCHICINE 0.6 MG/1
0.6 TABLET ORAL DAILY
Status: DISCONTINUED | OUTPATIENT
Start: 2024-06-23 | End: 2024-06-23

## 2024-06-23 RX ORDER — IBUPROFEN 600 MG/1
600 TABLET, FILM COATED ORAL EVERY 6 HOURS PRN
Qty: 60 TABLET | Refills: 0 | Status: SHIPPED | OUTPATIENT
Start: 2024-06-23

## 2024-06-23 RX ORDER — COLCHICINE 0.6 MG/1
1.2 TABLET ORAL ONCE
Status: COMPLETED | OUTPATIENT
Start: 2024-06-23 | End: 2024-06-23

## 2024-06-23 RX ADMIN — IBUPROFEN 600 MG: 600 TABLET ORAL at 15:32

## 2024-06-23 RX ADMIN — COLCHICINE 1.2 MG: 0.6 TABLET ORAL at 16:18

## 2024-06-23 ASSESSMENT — COLUMBIA-SUICIDE SEVERITY RATING SCALE - C-SSRS
6. HAVE YOU EVER DONE ANYTHING, STARTED TO DO ANYTHING, OR PREPARED TO DO ANYTHING TO END YOUR LIFE?: NO
2. HAVE YOU ACTUALLY HAD ANY THOUGHTS OF KILLING YOURSELF IN THE PAST MONTH?: NO
1. IN THE PAST MONTH, HAVE YOU WISHED YOU WERE DEAD OR WISHED YOU COULD GO TO SLEEP AND NOT WAKE UP?: NO

## 2024-06-23 ASSESSMENT — ACTIVITIES OF DAILY LIVING (ADL)
ADLS_ACUITY_SCORE: 33
ADLS_ACUITY_SCORE: 35
ADLS_ACUITY_SCORE: 35

## 2024-06-23 NOTE — ED PROVIDER NOTES
"  Emergency Department Note      History of Present Illness     Chief Complaint  Foot Pain    HPI  Vane Galiica is a 44 year old female with history of left atrial enlargement and iron deficiency who presents to the ED with her  for evaluation of foot pain. The patient reports yesterday she developed mcwilliams foot pain which worsened at night. She notes that ambulation worsens the pain. She states that the pain is not present in the toes or ankle, she adds that the pain is under her foot. The patient reports no resent wounds, injuries, heavy red meat consumption, or alcohol consumption.    Independent Historian  None    Review of External Notes  None  Past Medical History   Medical History and Problem List  Anxiety   Depression  Fibroid uterus   Iron deficiency   Mass of left breast   Uterine leiomyoma   Left atrial enlargement    Medications  Prozac   Zofran    Surgical History   Past Surgical History:   Procedure Laterality Date    ESOPHAGOSCOPY, GASTROSCOPY, DUODENOSCOPY (EGD), COMBINED N/A 1/4/2018    Procedure: COMBINED ESOPHAGOSCOPY, GASTROSCOPY, DUODENOSCOPY (EGD);  EGD foreign body;  Surgeon: Marlyn Colindres MD;  Location:  GI     Physical Exam   Patient Vitals for the past 24 hrs:   BP Temp Pulse Resp SpO2 Height Weight   06/23/24 1452 129/63 97.9  F (36.6  C) 85 16 99 % 1.753 m (5' 9\") 86.2 kg (190 lb)     Physical Exam  General: Alert and cooperative with exam. Patient in no apparent distress. Normal mentation.  Head:  Scalp is NC/AT  Eyes:  No scleral icterus, PERRL  ENT:  The external nose and ears are normal.  Neck:  Normal range of motion without rigidity.  CV:  Regular rate and rhythm    No pathologic murmur   Resp:  Breath sounds are clear bilaterally    Non-labored, no retractions or accessory muscle use  GI:  Abdomen is soft, no distension, no tenderness. No peritoneal signs  MS:  Dorsal aspect of right foot over cuneiform bones with erythema and swelling. No appreciable " warmth. Area is tender to the touch. Skin intact.  Skin:  Warm and dry, No rash or lesions noted.  Neuro:  Oriented x 3. No gross motor deficits.    Diagnostics   Lab Results   Labs Ordered and Resulted from Time of ED Arrival to Time of ED Departure - No data to display    Imaging  Foot  XR, G/E 3 views, right   Final Result   IMPRESSION: Normal joint spaces and alignment. No fracture.        Independent Interpretation  I independently interpreted the right foot X-Ray which showed no acute fracture or dislocation  ED Course    Medications Administered  Medications   colchicine (COLCRYS) tablet 0.6 mg (has no administration in time range)   ibuprofen (ADVIL/MOTRIN) tablet 600 mg (600 mg Oral $Given 6/23/24 1532)   colchicine (COLCRYS) tablet 1.2 mg (1.2 mg Oral $Given 6/23/24 1618)     Procedures  Procedures     Discussion of Management  None    Social Determinants of Health adding to complexity of care  None    ED Course  ED Course as of 06/23/24 1650   Sun Jun 23, 2024   1512 I obtained history and examined the patient as noted above.    1545 I rechecked and updated the patient.      Medical Decision Making / Diagnosis   CMS Diagnoses: None    MIPS     None    [facility]  Vane Galicia is a 44 year old female who presents with atraumatic right foot pain.  Symptom onset last night.  Patient denies any injuries or trauma to the foot.  States that it has been very painful to ambulate the pain improves when she is not weightbearing.  She has full range of motion of the ankle without difficulty or pain.  Tenderness to palpation present to dorsal aspect of right foot over cuneiform bones, area is erythematous and slightly swollen as well.  No appreciable warmth present.  Considered etiologies including fracture, dislocation, septic joint, cellulitis, gout, among others.  Low suspicion for cellulitis or septic joint given lack of warmth on palpation, skin intact with no wounds present.  No evidence of insect bite presents  either.  X-ray was obtained of the foot and does not show any evidence of acute fracture or dislocation.  Although patient does not have significant history of alcohol use or red meat consumption, suspicion for gout given the nature of her symptoms.  Area is difficult for aspiration.  Will treat with colchicine x 2 and encouraged ongoing ibuprofen, scheduled, for the next several days.  I provided a referral to podiatry, also provided contact information for [facility] orthopedics for follow-up.  Encouraged patient to also follow-up with her primary care provider within the week for reassessment and to ensure that symptoms are improving.  Continue ibuprofen for additional 3 days after symptomatic improvement.  If she develops any worsening symptoms such as increased redness and swelling, warmth to the touch, worsening pain, or other concerning symptoms, she will return to ER.    Disposition  The patient was discharged.     ICD-10 Codes:    ICD-10-CM    1. Right foot pain  M79.671 Orthopedic  Referral           Discharge Medications  New Prescriptions    IBUPROFEN (ADVIL/MOTRIN) 600 MG TABLET    Take 1 tablet (600 mg) by mouth every 6 hours as needed for moderate pain     ICharlotte, am serving as a scribe at 3:12 PM on 6/23/2024 to document services personally performed by Rosalina Gold PA-C based on my observations and the provider's statements to me.        Rosalina Gold PA-C  06/23/24 [id number]

## 2024-06-23 NOTE — ED TRIAGE NOTES
Pt notes that her R foot started hurting yesterday; does not remember injuring it at all.  Swelling noted.

## 2024-06-23 NOTE — DISCHARGE INSTRUCTIONS
Please take 600 mg of ibuprofen every 6 hours.  Once pain has resolved, swelling and redness has improved, continue ibuprofen for 3 additional days every 6 hours.    If you develop worsening redness, streaking up the leg, worsening pain or swelling, return to ER.    Podiatry referral was placed, they will likely call tomorrow.  You can also follow-up at [facility] orthopedics for follow-up.  I have provided their phone number for you if you wish to call them.  Additionally, please follow-up with your primary care provider in 1 to 2 weeks to ensure that pain is well-controlled.

## 2024-06-24 ENCOUNTER — PATIENT OUTREACH (OUTPATIENT)
Dept: FAMILY MEDICINE | Facility: CLINIC | Age: 44
End: 2024-06-24
Payer: COMMERCIAL

## 2024-06-24 NOTE — TELEPHONE ENCOUNTER
Routing to triage for hospital follow up / post discharge assessment    Amber ALCANTARA  [facility]

## 2024-06-26 ENCOUNTER — THERAPY VISIT (OUTPATIENT)
Dept: OCCUPATIONAL THERAPY | Facility: CLINIC | Age: 44
End: 2024-06-26
Payer: COMMERCIAL

## 2024-06-26 DIAGNOSIS — R41.3 MEMORY CHANGES: ICD-10-CM

## 2024-06-26 DIAGNOSIS — R41.3 MEMORY LOSS: Primary | ICD-10-CM

## 2024-06-26 PROCEDURE — 97535 SELF CARE MNGMENT TRAINING: CPT | Mod: GO

## 2024-06-26 NOTE — TELEPHONE ENCOUNTER
Patient is requesting order for ultrasound for follow up on ovarian cyst. Patient came to Urgent Care to try and obtain ultrasound but is not something that would be done with UC. Patient had CT scan with [facility] that showed ovarian cyst on 06/18/24. Please advise.    Jensen Marinelli CMA on 6/26/2024 at 10:57 AM

## 2024-06-28 ENCOUNTER — OFFICE VISIT (OUTPATIENT)
Dept: PODIATRY | Facility: CLINIC | Age: 44
End: 2024-06-28
Attending: STUDENT IN AN ORGANIZED HEALTH CARE EDUCATION/TRAINING PROGRAM
Payer: COMMERCIAL

## 2024-06-28 VITALS — DIASTOLIC BLOOD PRESSURE: 80 MMHG | SYSTOLIC BLOOD PRESSURE: 122 MMHG

## 2024-06-28 DIAGNOSIS — Q66.72 CONGENITAL BILATERAL PES CAVUS: ICD-10-CM

## 2024-06-28 DIAGNOSIS — Q66.71 CONGENITAL BILATERAL PES CAVUS: ICD-10-CM

## 2024-06-28 DIAGNOSIS — M79.671 RIGHT FOOT PAIN: Primary | ICD-10-CM

## 2024-06-28 LAB — URATE SERPL-MCNC: 4.2 MG/DL (ref 2.4–5.7)

## 2024-06-28 PROCEDURE — 84550 ASSAY OF BLOOD/URIC ACID: CPT | Performed by: PODIATRIST

## 2024-06-28 PROCEDURE — 36415 COLL VENOUS BLD VENIPUNCTURE: CPT | Performed by: PODIATRIST

## 2024-06-28 PROCEDURE — 99203 OFFICE O/P NEW LOW 30 MIN: CPT | Performed by: PODIATRIST

## 2024-06-28 NOTE — PROGRESS NOTES
ASSESSMENT:  Encounter Diagnoses   Name Primary?    Right foot pain Yes    Congenital bilateral pes cavus        MEDICAL DECISION MAKING:  I personally reviewed the previous right foot x-ray images.  Nothing appreciated in the midfoot to explain her pain.    It is hard to provide diagnosis, as her pain is completely resolved.  There is no residual erythema or edema.  Clinical and radiographic exam unremarkable.    The differential diagnosis includes gout, stress fractures/stress reaction of bone, midfoot arthritis.  Given the described history, I favor gout.    Important thing is that it has resolved.  I explained this might not happen again.  Is difficult to now prove if her pain was related to gout.    Uric acid is ordered and pending.  If this is elevated, we will consider a dual-energy CT.  Otherwise this testing can be done in the future if there is a another flareup.    I explained that podiatry is often helpful with the initial diagnosis and acute symptoms.  Long-term management of gout is typically handled by the primary care provider.    I recommend she consider a more accommodative type arch support for her higher arched feet.    Disclaimer: This note consists of symbols derived from keyboarding, dictation and/or voice recognition software. As a result, there may be errors in the script that have gone undetected. Please consider this when interpreting information found in this chart.    Gino Perry DPM, FACFAS, MS    [place] [facility] of Podiatry/Foot & Ankle Surgery      ____________________________________________________________________    HPI:       Vane Galicia presents today requesting that we check for gout.  She has pain on the top of the right foot.  Aching pain rated an 8 out of 10 at worst.  She had this occur once.  Pain was worse with weightbearing.  She presented to the emergency department on 6/23/2024.  X-rays were negative.  There is no recent uric acid.    She reports that there  was redness, swelling and pain.  She outlines the dorsal midfoot.  This is all resolved.  *  Past Medical History:   Diagnosis Date    Anxiety     Depression     Fibroid uterus     Iron deficiency    *  *  Past Surgical History:   Procedure Laterality Date    ESOPHAGOSCOPY, GASTROSCOPY, DUODENOSCOPY (EGD), COMBINED N/A 1/4/2018    Procedure: COMBINED ESOPHAGOSCOPY, GASTROSCOPY, DUODENOSCOPY (EGD);  EGD foreign body;  Surgeon: Marlyn Colindres MD;  Location:  GI   *  *  Current Outpatient Medications   Medication Sig Dispense Refill    cyanocobalamin (VITAMIN B-12) 1000 MCG tablet Take 1 tablet (1,000 mcg) by mouth daily 90 tablet 3    ferrous sulfate (FEROSUL) 325 (65 Fe) MG tablet Take 1 tablet (325 mg) by mouth daily (with breakfast) 180 tablet 1    FLUoxetine (PROZAC) 20 MG capsule Take 20 mg by mouth daily      ibuprofen (ADVIL/MOTRIN) 600 MG tablet Take 1 tablet (600 mg) by mouth every 6 hours as needed for moderate pain 60 tablet 0    ondansetron (ZOFRAN ODT) 4 MG ODT tab Take 1 tablet (4 mg) by mouth every 8 hours as needed for nausea 20 tablet 0         EXAM:    Vitals: /80   LMP 05/29/2024 (Approximate)   BMI: There is no height or weight on file to calculate BMI.    Constitutional:  Vane Galicia is in no apparent distress, appears well-nourished.  Cooperative with history and physical exam.    Vascular:  Pedal pulses are palpable for both the DP and PT arteries.  CFT < 3 sec.  No edema.      Neuro: Light touch sensation is intact to the L4, L5, S1 distributions  No evidence of weakness, spasticity, or contracture in the lower extremities.     Derm: Normal texture and turgor.  No erythema, ecchymosis, or cyanosis.  No open lesions.     Musculoskeletal:    Lower extremity muscle strength is normal.  High medial longitudinal arches.  No pain with palpation over the central metatarsals of the right foot.  No pain with manipulation of the midfoot joints in the frontal and transverse  plane.    EXAM: XR FOOT RIGHT G/E 3 VIEWS  LOCATION: [facility]  DATE: 6/23/2024     INDICATION: superior right foot pain and swelling  COMPARISON: None.                                                                      IMPRESSION: Normal joint spaces and alignment. No fracture.

## 2024-06-28 NOTE — PATIENT INSTRUCTIONS
Thank you for choosing [facility] Podiatry / Foot & Ankle Surgery!    DR. MILLER'S CLINIC LOCATIONS:     [facility] TRIAGE LINE: [phone]   [address] [place] [address] APPOINTMENTS: [phone]   [place] [address] RADIOLOGY: [phone]   (Every other Tues - Wed - Fri PM) SET UP SURGERY: [phone]    PHYSICAL THERAPY: [phone]   [place] SPECIALTY BILLING QUESTIONS: [phone] [address] FAX: [phone]   [place] [address]    (Thurs & Fri AM)         GOUT  Gout is a disorder that results from the build-up of uric acid in the tissues or a joint. It most often affects the joint of the big toe.  CAUSES  Gout attacks are caused by deposits of crystallized uric acid in the joint. Uric acid is present in the blood and eliminated in the urine, but in people who have gout, uric acid accumulates and crystallizes in the joints. Uric acid is the result of the breakdown of purines, chemicals that are found naturally in our bodies and in food. Some people develop gout because their kidneys have difficulty eliminating normal amounts of uric acid, while others produce too much uric acid.  Gout occurs most commonly in the big toe because uric acid is sensitive to temperature changes. At cooler temperatures, uric acid turns into crystals. Since the toe is the part of the body that is farthest from the heart, it s also the coolest part of the body - and, thus, the most likely target of gout. However, gout can affect any joint in the body.  The tendency to accumulate uric acid is often inherited. Other factors that put a person at risk for developing gout include: high blood pressure, diabetes, obesity, surgery, chemotherapy, stress, and certain medications and vitamins. For example, the body s ability to remove uric acid can be negatively affected by taking aspirin, some diuretic medications ( water pills ), and the vitamin niacin (also called nicotinic acid). While gout is more common in  men aged 40 to 60 years, it can occur in younger men as well as in women.  Consuming foods and beverages that contain high levels of purines can trigger an attack of gout. Some foods contain more purines than others and have been associated with an increase of uric acid, which leads to gout. You may be able to reduce your chances of getting a gout attack by limiting or avoiding shellfish, organ meats (kidney, liver, etc.), red wine, beer, and red meat.  Other Triggers include but are not limited to:  Congestive heart failure, deep vein thrombosis, pneumonia, fasting, dehydration, trauma/surgery, psoriasis.  SYMTOMS  An attack of gout can be miserable, marked by the following symptoms:  Intense pain that comes on suddenly - often in the middle of the night or upon arising   Signs of inflammation such as redness, swelling, and warmth over the joint.   DIAGNOSIS  To diagnose gout, the foot and ankle surgeon will ask questions about your personal and family medical history, followed by an examination of the affected joint. Laboratory tests and x-rays are sometimes ordered to determine if the inflammation is caused by something other than gout.  TREATMENT  Initial treatment of an attack of gout typically includes the following:  Medications. Prescription medications or injections are used to treat the pain, swelling, and inflammation.   Dietary restrictions. Foods and beverages that are high in purines should be avoided, since purines are converted in the body to uric acid.   Fluids. Drink plenty of water and other fluids each day, while also avoiding alcoholic beverages, which cause dehydration. Cherry Juice works well to help decrease pain.  Immobilize and elevate the foot. Avoid standing and walking to give your foot a rest. Also, elevate your foot (level with or slightly above the heart) to help reduce swelling.   The symptoms of gout and the inflammatory process usually resolve in three to ten days with treatment.  If gout symptoms continue despite the initial treatment, or if repeated attacks occur, see your primary care physician for maintenance treatment that may involve daily medication. In cases of repeated episodes, the underlying problem must be addressed, as the build-up of uric acid over time can cause arthritic damage to the joint.  DIET CHANGE  A gout diet reduces your intake of foods that are high in purines, which helps control your body's production of uric acid. If you're overweight or obese, lose weight. However, avoid fasting and rapid weight loss because these can promote a gout attack. Drink plenty of fluids to help flush uric acid from your body. Also avoid high-protein diets, which can cause you to produce too much uric acid (hyperuricemia).   To follow the diet:   Limit meat, poultry and fish. Animal proteins are high in purine. Avoid or severely limit high-purine foods, such as organ meats, herring, anchovies and mackerel. Red meat (beef, pork and lamb), fatty fish and seafood (tuna, shrimp, lobster and scallops) are associated with increased risk of gout. Because all meat, poultry and fish contain purines, limit your intake to 4 to 6 ounces (113 to 170 grams) daily.   Eat more plant-based proteins. You can increase your protein by including more plant-based sources, such as beans and legumes. This switch will also help you cut down on saturated fats, which may indirectly contribute to obesity and gout.   Limit or avoid alcohol. Alcohol interferes with the elimination of uric acid from your body. Drinking beer, in particular, has been linked to gout attacks. If you're having an attack, avoid alcohol. However, when you're not having an attack, drinking one or two 5-ounce (148 milliliter) servings a day of wine is not likely to increase your risk.   Drink plenty of fluids, particularly water. Fluids can help remove uric acid from your body. Aim for eight to 16 8-ounce (237 milliliter) glasses a day.    Choose low-fat or fat-free dairy products. Some studies have shown that drinking skim or low-fat milk and eating foods made with them, such as yogurt, help reduce the risk of gout. Aim for adequate dairy intake of 16 to 24 fluid ounces (473 to 710 milliliters) daily.   Choose complex carbohydrates. Eat more whole grains and fruits and vegetables and fewer refined carbohydrates, such as white bread, cakes and candy.   Limit or avoid sugar. Too many sweets can leave you with no room for plant-based proteins and low-fat or fat-free dairy products -- the foods you need to avoid gout. Sugary foods also tend to be high in calories, so they make it easier to eat more than you're likely to burn off. Although there's debate about whether sugar has a direct effect on uric acid levels, sweets are definitely linked to overweight and obesity.   There's also some evidence that drinking four to six cups of coffee a day lowers gout risk in men.   RESULTS  Following a gout diet can help you limit your body's uric acid production and increase its elimination. It's not likely to lower the uric acid concentration in your blood enough to treat your gout without medication, but it may help decrease the number of attacks and limit their severity. Following the gout diet and limiting your calories -- particularly if you also add in moderate daily exercise, such as brisk walking -- also can improve your overall health by helping you achieve and maintain a healthy weight.       [facility] LOCATIONS  [address]  [phone]   [address]  [phone] [place]  [address] [facility]  [phone]   [place]  [address] [facility] [address]  [phone]   [place]  [address]  [phone] [place]  [address] [place] [address] [place]  [phone]   [place]  [address] [place] [address]  [phone]   [place]  [address]  [phone] [place]  [address]  [phone]

## 2024-06-28 NOTE — LETTER
6/28/2024      Vane Galicia  [address]  [facility] [address]      Dear Colleague,    Thank you for referring your patient, Vane Galicia, to the [facility]. Please see a copy of my visit note below.    ASSESSMENT:  Encounter Diagnoses   Name Primary?     Right foot pain Yes     Congenital bilateral pes cavus        MEDICAL DECISION MAKING:  I personally reviewed the previous right foot x-ray images.  Nothing appreciated in the midfoot to explain her pain.    It is hard to provide diagnosis, as her pain is completely resolved.  There is no residual erythema or edema.  Clinical and radiographic exam unremarkable.    The differential diagnosis includes gout, stress fractures/stress reaction of bone, midfoot arthritis.  Given the described history, I favor gout.    Important thing is that it has resolved.  I explained this might not happen again.  Is difficult to now prove if her pain was related to gout.    Uric acid is ordered and pending.  If this is elevated, we will consider a dual-energy CT.  Otherwise this testing can be done in the future if there is a another flareup.    I explained that podiatry is often helpful with the initial diagnosis and acute symptoms.  Long-term management of gout is typically handled by the primary care provider.    I recommend she consider a more accommodative type arch support for her higher arched feet.    Disclaimer: This note consists of symbols derived from keyboarding, dictation and/or voice recognition software. As a result, there may be errors in the script that have gone undetected. Please consider this when interpreting information found in this chart.    Gino Perry, SANDHYA, FACFAS, MS    [facility] Department of Podiatry/Foot & Ankle Surgery      ____________________________________________________________________    HPI:       Vane Galicia presents today requesting that we check for gout.  She has pain on the top  of the right foot.  Aching pain rated an 8 out of 10 at worst.  She had this occur once.  Pain was worse with weightbearing.  She presented to the emergency department on 6/23/2024.  X-rays were negative.  There is no recent uric acid.    She reports that there was redness, swelling and pain.  She outlines the dorsal midfoot.  This is all resolved.  *  Past Medical History:   Diagnosis Date     Anxiety      Depression      Fibroid uterus      Iron deficiency    *  *  Past Surgical History:   Procedure Laterality Date     ESOPHAGOSCOPY, GASTROSCOPY, DUODENOSCOPY (EGD), COMBINED N/A 1/4/2018    Procedure: COMBINED ESOPHAGOSCOPY, GASTROSCOPY, DUODENOSCOPY (EGD);  EGD foreign body;  Surgeon: Marlyn Colindres MD;  Location:  GI   *  *  Current Outpatient Medications   Medication Sig Dispense Refill     cyanocobalamin (VITAMIN B-12) 1000 MCG tablet Take 1 tablet (1,000 mcg) by mouth daily 90 tablet 3     ferrous sulfate (FEROSUL) 325 (65 Fe) MG tablet Take 1 tablet (325 mg) by mouth daily (with breakfast) 180 tablet 1     FLUoxetine (PROZAC) 20 MG capsule Take 20 mg by mouth daily       ibuprofen (ADVIL/MOTRIN) 600 MG tablet Take 1 tablet (600 mg) by mouth every 6 hours as needed for moderate pain 60 tablet 0     ondansetron (ZOFRAN ODT) 4 MG ODT tab Take 1 tablet (4 mg) by mouth every 8 hours as needed for nausea 20 tablet 0         EXAM:    Vitals: /80   LMP 05/29/2024 (Approximate)   BMI: There is no height or weight on file to calculate BMI.    Constitutional:  Vane Glaicia is in no apparent distress, appears well-nourished.  Cooperative with history and physical exam.    Vascular:  Pedal pulses are palpable for both the DP and PT arteries.  CFT < 3 sec.  No edema.      Neuro: Light touch sensation is intact to the L4, L5, S1 distributions  No evidence of weakness, spasticity, or contracture in the lower extremities.     Derm: Normal texture and turgor.  No erythema, ecchymosis, or cyanosis.   No open lesions.     Musculoskeletal:    Lower extremity muscle strength is normal.  High medial longitudinal arches.  No pain with palpation over the central metatarsals of the right foot.  No pain with manipulation of the midfoot joints in the frontal and transverse plane.    EXAM: XR FOOT RIGHT G/E 3 VIEWS  LOCATION: [facility]  DATE: 6/23/2024     INDICATION: superior right foot pain and swelling  COMPARISON: None.                                                                      IMPRESSION: Normal joint spaces and alignment. No fracture.      Again, thank you for allowing me to participate in the care of your patient.        Sincerely,        Gino Perry DPM

## 2024-07-01 ENCOUNTER — THERAPY VISIT (OUTPATIENT)
Dept: OCCUPATIONAL THERAPY | Facility: CLINIC | Age: 44
End: 2024-07-01
Payer: COMMERCIAL

## 2024-07-01 DIAGNOSIS — R41.3 MEMORY CHANGES: ICD-10-CM

## 2024-07-01 DIAGNOSIS — R41.3 MEMORY LOSS: Primary | ICD-10-CM

## 2024-07-01 PROCEDURE — 97535 SELF CARE MNGMENT TRAINING: CPT | Mod: GO

## 2024-07-03 ENCOUNTER — ANCILLARY PROCEDURE (OUTPATIENT)
Dept: ULTRASOUND IMAGING | Facility: CLINIC | Age: 44
End: 2024-07-03
Attending: PHYSICIAN ASSISTANT
Payer: COMMERCIAL

## 2024-07-03 DIAGNOSIS — R93.5 ABNORMAL CT OF THE ABDOMEN: ICD-10-CM

## 2024-07-03 PROCEDURE — 76830 TRANSVAGINAL US NON-OB: CPT

## 2024-07-03 PROCEDURE — 76856 US EXAM PELVIC COMPLETE: CPT

## 2024-07-03 NOTE — RESULT ENCOUNTER NOTE
Ronaldo Cifuentes,    This is Gino Gay and I am a PA who is covering for Janessa Aguilar PA-C who is currently out of the office. I had a chance to review your recent pelvic ultrasound.  As visualized on your CT abdomen pelvis from your visit a month ago he had a very large uterine fibroid.  We have an appointment next week to discuss this further.  Additionally, you have a simple right ovarian cyst.     Let me know if you have any questions or concerns,     Gino Gay PA-C  [facility]

## 2024-07-08 ENCOUNTER — HOSPITAL ENCOUNTER (OUTPATIENT)
Dept: MAMMOGRAPHY | Facility: CLINIC | Age: 44
Discharge: HOME | End: 2024-07-08
Payer: COMMERCIAL

## 2024-07-08 DIAGNOSIS — Z12.31 ENCOUNTER FOR SCREENING MAMMOGRAM FOR BREAST CANCER: ICD-10-CM

## 2024-07-08 PROCEDURE — 77063 BREAST TOMOSYNTHESIS BI: CPT

## 2024-07-09 ENCOUNTER — VIRTUAL VISIT (OUTPATIENT)
Dept: FAMILY MEDICINE | Facility: CLINIC | Age: 44
End: 2024-07-09
Payer: COMMERCIAL

## 2024-07-09 DIAGNOSIS — D25.9 UTERINE LEIOMYOMA, UNSPECIFIED LOCATION: Primary | ICD-10-CM

## 2024-07-09 DIAGNOSIS — N83.209 CYST OF OVARY, UNSPECIFIED LATERALITY: ICD-10-CM

## 2024-07-09 PROCEDURE — 99213 OFFICE O/P EST LOW 20 MIN: CPT | Mod: 95 | Performed by: PHYSICIAN ASSISTANT

## 2024-07-09 NOTE — PROGRESS NOTES
Vane is a 44 year old who is being evaluated via a billable video visit.    How would you like to obtain your AVS? MyChart  If the video visit is dropped, the invitation should be resent by: Text to cell phone: [phone]  Will anyone else be joining your video visit? No      Assessment & Plan     Uterine leiomyoma, unspecified location  Cyst of ovary, unspecified laterality    Reviewed pelvic ultrasound.  Ovarian cyst which requires no further follow-up in addition to a large uterine fibroid.  Did undergo embolization of this back in January.  She is requesting referral to OB/GYN.  Placed this referral for her.    - Ob/Gyn  Referral    Subjective   Vane is a 44 year old, presenting for the following health issues:  Results    Here today to discuss pelvic ultrasound results.  Was evaluated at [facility] on 6/18/2024.  Diagnosed with gastroenteritis.  CT abdomen pelvis at that time revealed a large uterine fibroid ~ 10.4 cm which she was aware of but also was unable to visualize the right ovary.  Symptoms from the ADS visit have entirely resolved.    Video Start Time:  430      Review of Systems  Constitutional, neuro, ENT, endocrine, pulmonary, cardiac, gastrointestinal, genitourinary, musculoskeletal, integument and psychiatric systems are negative, except as otherwise noted.      Objective           Vitals:  No vitals were obtained today due to virtual visit.    Physical Exam   GENERAL: alert and no distress  EYES: Eyes grossly normal to inspection.  No discharge or erythema, or obvious scleral/conjunctival abnormalities.  RESP: No audible wheeze, cough, or visible cyanosis.    SKIN: Visible skin clear. No significant rash, abnormal pigmentation or lesions.  NEURO: Cranial nerves grossly intact.  Mentation and speech appropriate for age.  PSYCH: Appropriate affect, tone, and pace of words        Video-Visit Details    Type of service:  Video Visit   Video End Time: 4:40  Originating Location (pt.  Location): Home    Distant Location (provider location):  On-site  Platform used for Video Visit: Doximity    The likelihood of other entities in the differential is insufficient to justify any further testing for them at this time. This was explained to the patient. The patient was advised that persistent or worsening symptoms would require further evaluation. Patient advised to call the office and if unable to reach to go to the emergency room if they develop any new or worsening symptoms. Expressed understanding and agreement with above stated plan.     Signed Electronically by: Gino Gay PA-C

## 2024-07-12 ENCOUNTER — ALLIED HEALTH/NURSE VISIT (OUTPATIENT)
Dept: FAMILY MEDICINE | Facility: CLINIC | Age: 44
End: 2024-07-12
Payer: COMMERCIAL

## 2024-07-12 DIAGNOSIS — Z23 NEED FOR VACCINATION: Primary | ICD-10-CM

## 2024-07-12 PROCEDURE — 90746 HEPB VACCINE 3 DOSE ADULT IM: CPT

## 2024-07-12 PROCEDURE — 90471 IMMUNIZATION ADMIN: CPT

## 2024-07-12 PROCEDURE — 99207 PR NO CHARGE NURSE ONLY: CPT

## 2024-07-15 ENCOUNTER — THERAPY VISIT (OUTPATIENT)
Dept: OCCUPATIONAL THERAPY | Facility: CLINIC | Age: 44
End: 2024-07-15
Payer: COMMERCIAL

## 2024-07-15 DIAGNOSIS — R41.3 MEMORY CHANGES: ICD-10-CM

## 2024-07-15 DIAGNOSIS — R41.3 MEMORY LOSS: Primary | ICD-10-CM

## 2024-07-15 PROCEDURE — 97537 COMMUNITY/WORK REINTEGRATION: CPT | Mod: GO

## 2024-07-16 NOTE — PROGRESS NOTES
DISCHARGE  Reason for Discharge: Patient has met all goals.    Equipment Issued: n/a    Discharge Plan: implement learned cognitive/attention strategies    Referring Provider:  Mireya Gonzales    07/15/24 0500   Appointment Info   Treating Provider Yumiko Hernandez OTR/L   Visits Used 4   Medical Diagnosis Memory loss (R41.3)   OT Tx Diagnosis memory changes   Quick Add  Certification   Progress Note/Certification   Start Of Care Date 06/14/24   Onset of Illness/Injury or Date of Surgery 06/12/24  (order date)   Therapy Frequency 1x/week   Predicted Duration 4 weeks   Certification date from 06/14/24   Certification date to 07/12/24   Progress Note Due Date 06/14/24   Goals   OT Goals 1;2   OT Goal 1   Goal Identifier Memory/Attention   Goal Description Pt will verbalize understanding about strategies to help with improving attention (concentration) and internal and external strategies to help with memory and recall into daily routine for improved ADL/IADL performance   Goal Progress Goal met; Patient verbalizes understanding and implement internal and external strateiges to aid memory, strategies to optimize focus and attention, and recommended study habits to optimize performance in school   Target Date 07/12/24   Date Met 07/01/24   OT Goal 2   Goal Identifier Executive Function   Goal Description Patient will learn and implement 2 executive function strategies to improve performance to 90% accy on a novel reasoning or problem solving task in order to support problem solving abilities for independent living.   Goal Progress Goal met   Target Date 07/12/24   Date Met 07/15/24   Subjective Report   Subjective Report Pt states she has found ADHD medication from pharmacy in [place]. She has also purchased a calendar she is motivated to use.  She has noted depression symptoms can impact her ability to initate tasks, which can impact school performance. She states she feels like her memory has improved with supplements  but her attention continues to be challenging   Treatment Interventions (OT)   Interventions Community/Work Reintegration   Community/Work Reintegration   Community/Work Reintegration Minutes (38210) 45   Community/Work Reintegration Community/Work Reintegration 2   Community/Work Reintegration 1 WPCA Level II Version A   Community/Work Reintegration 1 - Details Skilled facilitation of weekly calendar planning activity (WPCA Level II Version A) to assess executive function (planning, organizing, cognitive flexibility, attention, etc) and use of cognitive strategies when applied to a complex task. Pt was given 17 appointments/tasks (some fixed and some variable) to enter into a weekly calendar following 2 specific rules. She implemented strategies including crossing out Wednesday to cue her not to schedule on Wednesdays, entering fixed appointments then variable appointments, and entering all appointments accurately.   Community/Work Reintegration 2 - Details Skilled follow up and review of learned stratregies for improved self-management of condition. Reviewed strategies to aid memory/retention and study habits to promote performance in school inlcuidn gusing multiple sensory inputs including: highlighting key points, re-writing, reading out loud, summarizing in own words, repetition/review, color coded organization strategies with planner for each class and deadline and notes, creating diagrams can be effective study tips. These techniques engage your senses and make learning more effective and interactive. Additionally, creating an environment that supports focus is essential including minimizing distractions, implementing breaks, incorporating exercise into daily routine- regular physical activity sharpens your focus and enhances concentration. By incorporating exercise into your routine, you maintain mental clarity when studying. Discussed additional strategies including avoiding procrastination, dividing  studying and projects into smaller more manageable parts, discussed factors that affect memory/cognition including remembering to eat healthy, getting plenty of rest for optimal participation in your day. maintaining a positive attitude- worrying and anxiety create clutter in your brain leaving little space for learning and remembering, and finding time for leisure/hobbies. Pt verbalized understanding and she was appreciative. Pt feels confident implementing learned strategies and ready for discharge   Education   Learner/Method Patient;Listening;Demonstration   Plan   Updates to plan of care Goals met, no further outpatient occupational therapy indicated at this time   Total Session Time   Timed Code Treatment Minutes 45   Total Treatment Time (sum of timed and untimed services) 45

## 2024-08-20 PROBLEM — N93.9 ABNORMAL UTERINE BLEEDING: Status: ACTIVE | Noted: 2024-08-20

## 2024-08-20 PROBLEM — G93.32 CHRONIC FATIGUE SYNDROME: Status: ACTIVE | Noted: 2024-08-20

## 2024-08-20 RX ORDER — METHYLPHENIDATE HYDROCHLORIDE 10 MG/1
CAPSULE, EXTENDED RELEASE ORAL
COMMUNITY
Start: 2024-07-12

## 2024-08-20 NOTE — PROGRESS NOTES
SUBJECTIVE:                                                   Vane Galicia is a 44 year old female who presents to clinic today for the following health issue(s):  Patient presents with:  Vaginal Problem: Fibroids and irregular menses        HPI:  New patient to me here today for consultation of uterine fibroids.  She had a uterine fibroid embolism in January of this year and [facility].  She cannot remember the surgeon's name.  She believes it was at the outpatient surgery center.  She states that for a while she had no issues after surgery until the last 2 months she has been having menstrual cycles 2 weeks apart.  She states that she will spot for 4 days then 2 weeks later have 4 days of menstrual-like bleeding.  Her bleeding flow is better and the lighter than it was prior to the embolization.  Ultrasound was performed with primary care last month and she was found to have a 10 cm uterine fibroid.  Her EMS was normal.      She does state that she has urinary frequency when she is up walking.  She has questions whether this could be related to perimenopause.  She has been feeling warmer and has some brain fog.    Patient's last menstrual period was 08/13/2024 (exact date)..     Patient is sexually active, No obstetric history on file..  Using none and male surgery  for contraception.    reports that she has never smoked. She has never used smokeless tobacco.    STD testing offered?  Declined    Health maintenance updated:  colon    Today's PHQ-2 Score:       2/21/2023    10:54 AM   PHQ-2 ( 1999 [employer])   Q1: Little interest or pleasure in doing things 2   Q2: Feeling down, depressed or hopeless 2   PHQ-2 Score 4   Q1: Little interest or pleasure in doing things More than half the days   Q2: Feeling down, depressed or hopeless More than half the days   PHQ-2 Score 4     Today's PHQ-9 Score:       8/22/2024    10:10 AM   PHQ-9 SCORE   PHQ-9 Total Score MyChart 6 (Mild depression)   PHQ-9 Total Score 6      Today's ELAINA-7 Score:       8/22/2024    10:36 AM   ELAINA-7 SCORE   Total Score 5       Problem list and histories reviewed & adjusted, as indicated.  Additional history: as documented.    Patient Active Problem List   Diagnosis    Annual physical exam    Mass of left breast, unspecified quadrant    Family history of malignant neoplasm of breast    Generalized anxiety disorder    Moderate episode of recurrent major depressive disorder (H)    Uterine leiomyoma, unspecified location    SOB (shortness of breath)    High priority for 2019-nCoV vaccine    Screening for HIV (human immunodeficiency virus)    Need for hepatitis C screening test    Left atrial enlargement    Iron deficiency    Chronic fatigue syndrome    Abnormal uterine bleeding     Past Surgical History:   Procedure Laterality Date    ESOPHAGOSCOPY, GASTROSCOPY, DUODENOSCOPY (EGD), COMBINED N/A 1/4/2018    Procedure: COMBINED ESOPHAGOSCOPY, GASTROSCOPY, DUODENOSCOPY (EGD);  EGD foreign body;  Surgeon: Marlyn Colindres MD;  Location:  GI      Social History     Tobacco Use    Smoking status: Never    Smokeless tobacco: Never   Substance Use Topics    Alcohol use: No      Problem (# of Occurrences) Relation (Name,Age of Onset)    Heart Disease (1) Maternal Grandmother    Hypertension (1) Maternal Grandmother    Breast Cancer (1) Mother (55)    Esophageal Cancer (1) Paternal Grandfather: smoker    Diabetes Type 1 (1) Daughter           Negative family history of: Colon Cancer              Current Outpatient Medications   Medication Sig Dispense Refill    cyanocobalamin (VITAMIN B-12) 1000 MCG tablet Take 1 tablet (1,000 mcg) by mouth daily 90 tablet 3    ferrous sulfate (FEROSUL) 325 (65 Fe) MG tablet Take 1 tablet (325 mg) by mouth daily (with breakfast) 180 tablet 1    FLUoxetine (PROZAC) 20 MG capsule Take 20 mg by mouth daily      ibuprofen (ADVIL/MOTRIN) 600 MG tablet Take 1 tablet (600 mg) by mouth every 6 hours as needed for moderate  "pain 60 tablet 0    Multiple Vitamins-Minerals (ZINC PO) Zinc      ondansetron (ZOFRAN ODT) 4 MG ODT tab Take 1 tablet (4 mg) by mouth every 8 hours as needed for nausea 20 tablet 0    RITALIN LA 20 MG 24 hr capsule Take 20 mg by mouth daily.      VITAMIN D PO Vitamin D      COLLAGEN PO Collagen (Patient not taking: Reported on 8/22/2024)      RITALIN LA 10 MG 24 hr capsule TAKE ONE CAPSULE BY MOUTH EVERY DAY FOR 14 DAYS (Patient not taking: Reported on 8/22/2024)       No current facility-administered medications for this visit.     No Known Allergies          OBJECTIVE:     /74   Ht 1.753 m (5' 9\")   Wt 84.1 kg (185 lb 6.4 oz)   LMP 08/13/2024 (Exact Date)   BMI 27.38 kg/m    Body mass index is 27.38 kg/m .    Exam:  Constitutional:  Appearance: Well nourished, well developed alert, in no acute distress  Neurologic:  Mental Status:  Oriented X3.  Normal strength and tone, sensory exam grossly normal, mentation intact and speech normal.    Psychiatric:  Mentation appears normal and affect normal/bright.     In-Clinic Test Results:  No results found for this or any previous visit (from the past 24 hour(s)).    ASSESSMENT/PLAN:                                                        ICD-10-CM    1. Perimenopausal  N95.1       2. Uterine leiomyoma, unspecified location  D25.9 Ob/Gyn  Referral          There are no Patient Instructions on file for this visit.    Ultrasound from July was reviewed.  It is unclear whether this bleeding pattern is perimenopausal related or uterine fibroid related.  We have given her the option of observation versus surgical consult.  We did briefly discuss definitive therapy.  She will think about this and let us know if she needs anything from us in the future.  Perimenopause was discussed at length with her.    (20 minutes was spent on the date of the encounter doing chart review, review of outside records, review and interpretation of pertinent test results, history " and exam, documentation, patient counseling, and further activities as noted above.)     BRITNEY Mejía CNP  [facility]

## 2024-08-22 ENCOUNTER — OFFICE VISIT (OUTPATIENT)
Dept: OBGYN | Facility: CLINIC | Age: 44
End: 2024-08-22
Attending: PHYSICIAN ASSISTANT
Payer: COMMERCIAL

## 2024-08-22 VITALS
WEIGHT: 185.4 LBS | DIASTOLIC BLOOD PRESSURE: 74 MMHG | HEIGHT: 69 IN | BODY MASS INDEX: 27.46 KG/M2 | SYSTOLIC BLOOD PRESSURE: 118 MMHG

## 2024-08-22 DIAGNOSIS — N95.1 PERIMENOPAUSAL: Primary | ICD-10-CM

## 2024-08-22 DIAGNOSIS — D25.9 UTERINE LEIOMYOMA, UNSPECIFIED LOCATION: ICD-10-CM

## 2024-08-22 PROCEDURE — 99203 OFFICE O/P NEW LOW 30 MIN: CPT | Performed by: NURSE PRACTITIONER

## 2024-08-22 RX ORDER — METHYLPHENIDATE HCL 20 MG
20 CAPSULE,EXTENDED RELEASE BIPHASIC 50-50 ORAL DAILY
COMMUNITY
Start: 2024-08-08

## 2024-08-22 ASSESSMENT — ANXIETY QUESTIONNAIRES
6. BECOMING EASILY ANNOYED OR IRRITABLE: SEVERAL DAYS
3. WORRYING TOO MUCH ABOUT DIFFERENT THINGS: SEVERAL DAYS
6. BECOMING EASILY ANNOYED OR IRRITABLE: SEVERAL DAYS
3. WORRYING TOO MUCH ABOUT DIFFERENT THINGS: MORE THAN HALF THE DAYS
5. BEING SO RESTLESS THAT IT IS HARD TO SIT STILL: SEVERAL DAYS
7. FEELING AFRAID AS IF SOMETHING AWFUL MIGHT HAPPEN: NOT AT ALL
IF YOU CHECKED OFF ANY PROBLEMS ON THIS QUESTIONNAIRE, HOW DIFFICULT HAVE THESE PROBLEMS MADE IT FOR YOU TO DO YOUR WORK, TAKE CARE OF THINGS AT HOME, OR GET ALONG WITH OTHER PEOPLE: SOMEWHAT DIFFICULT
GAD7 TOTAL SCORE: 8
4. TROUBLE RELAXING: SEVERAL DAYS
GAD7 TOTAL SCORE: 5
1. FEELING NERVOUS, ANXIOUS, OR ON EDGE: SEVERAL DAYS
GAD7 TOTAL SCORE: 8
2. NOT BEING ABLE TO STOP OR CONTROL WORRYING: MORE THAN HALF THE DAYS
1. FEELING NERVOUS, ANXIOUS, OR ON EDGE: SEVERAL DAYS
5. BEING SO RESTLESS THAT IT IS HARD TO SIT STILL: NOT AT ALL
IF YOU CHECKED OFF ANY PROBLEMS ON THIS QUESTIONNAIRE, HOW DIFFICULT HAVE THESE PROBLEMS MADE IT FOR YOU TO DO YOUR WORK, TAKE CARE OF THINGS AT HOME, OR GET ALONG WITH OTHER PEOPLE: SOMEWHAT DIFFICULT
7. FEELING AFRAID AS IF SOMETHING AWFUL MIGHT HAPPEN: NOT AT ALL
GAD7 TOTAL SCORE: 8
8. IF YOU CHECKED OFF ANY PROBLEMS, HOW DIFFICULT HAVE THESE MADE IT FOR YOU TO DO YOUR WORK, TAKE CARE OF THINGS AT HOME, OR GET ALONG WITH OTHER PEOPLE?: SOMEWHAT DIFFICULT
2. NOT BEING ABLE TO STOP OR CONTROL WORRYING: SEVERAL DAYS
7. FEELING AFRAID AS IF SOMETHING AWFUL MIGHT HAPPEN: NOT AT ALL

## 2024-08-22 ASSESSMENT — PATIENT HEALTH QUESTIONNAIRE - PHQ9
5. POOR APPETITE OR OVEREATING: SEVERAL DAYS
SUM OF ALL RESPONSES TO PHQ QUESTIONS 1-9: 6
SUM OF ALL RESPONSES TO PHQ QUESTIONS 1-9: 6
10. IF YOU CHECKED OFF ANY PROBLEMS, HOW DIFFICULT HAVE THESE PROBLEMS MADE IT FOR YOU TO DO YOUR WORK, TAKE CARE OF THINGS AT HOME, OR GET ALONG WITH OTHER PEOPLE: VERY DIFFICULT

## 2024-09-18 ENCOUNTER — LAB (OUTPATIENT)
Dept: LAB | Facility: CLINIC | Age: 44
End: 2024-09-18
Payer: COMMERCIAL

## 2024-09-18 DIAGNOSIS — E61.1 IRON DEFICIENCY: ICD-10-CM

## 2024-09-18 DIAGNOSIS — E53.8 VITAMIN B12 DEFICIENCY (NON ANEMIC): ICD-10-CM

## 2024-09-18 LAB
IRON BINDING CAPACITY (ROCHE): 306 UG/DL (ref 240–430)
IRON SATN MFR SERPL: 25 % (ref 15–46)
IRON SERPL-MCNC: 75 UG/DL (ref 37–145)
VIT B12 SERPL-MCNC: 599 PG/ML (ref 232–1245)

## 2024-09-18 PROCEDURE — 82607 VITAMIN B-12: CPT

## 2024-09-18 PROCEDURE — 83550 IRON BINDING TEST: CPT

## 2024-09-18 PROCEDURE — 36415 COLL VENOUS BLD VENIPUNCTURE: CPT

## 2024-09-18 PROCEDURE — 83540 ASSAY OF IRON: CPT

## 2024-10-12 ASSESSMENT — ANXIETY QUESTIONNAIRES: GAD7 TOTAL SCORE: 8

## 2024-12-12 ENCOUNTER — ALLIED HEALTH/NURSE VISIT (OUTPATIENT)
Dept: FAMILY MEDICINE | Facility: CLINIC | Age: 44
End: 2024-12-12
Payer: COMMERCIAL

## 2024-12-12 DIAGNOSIS — Z23 ENCOUNTER FOR IMMUNIZATION: Primary | ICD-10-CM

## 2024-12-12 NOTE — PROGRESS NOTES
Prior to immunization administration, verified patients identity using patient s name and date of birth. Please see Immunization Activity for additional information.     Is the patient's temperature normal (100.5 or less)? Yes     Patient MEETS CRITERIA. PROCEED with vaccine administration.        12/12/2024   General Questionnaire    Do you have any questions for your care team about the vaccines you will be receiving today? no            Patient instructed to remain in clinic for 15 minutes afterwards, and to report any adverse reactions.      Link to Ancillary Visit Immunization Standing Orders SmartSet     Screening performed by Fauzia Abraham MA on 12/12/2024 at 11:02 AM.

## 2025-02-03 ENCOUNTER — TELEPHONE (OUTPATIENT)
Dept: GASTROENTEROLOGY | Facility: CLINIC | Age: 45
End: 2025-02-03
Payer: COMMERCIAL

## 2025-02-03 NOTE — TELEPHONE ENCOUNTER
"Endoscopy Scheduling Screen    Have you had any respiratory illness or flu-like symptoms in the last 10 days?  No    What is your communication preference for Instructions and/or Bowel Prep?   MyChart    What insurance is in the chart?  Other:  [facility]    Ordering/Referring Provider: MARTHA TAM   (If ordering provider performs procedure, schedule with ordering provider unless otherwise instructed. )    BMI: Estimated body mass index is 27.38 kg/m  as calculated from the following:    Height as of 8/22/24: 1.753 m (5' 9\").    Weight as of 8/22/24: 84.1 kg (185 lb 6.4 oz).     Sedation Ordered  moderate sedation.   If patient BMI > 50 do not schedule in [facility].    If patient BMI > 45 do not schedule at [facility].    Are you taking methadone or Suboxone?  NO, No RN review required.    Have you been diagnosed and are being treated for severe PTSD or severe anxiety?  NO, No RN review required.    Are you taking any prescription medications for pain 3 or more times per week?   NO, No RN review required.    Do you have a history of malignant hyperthermia?  No    (Females) Are you currently pregnant?   No     Have you been diagnosed or told you have pulmonary hypertension?   No    Do you have an LVAD?  No    Have you been told you have moderate to severe sleep apnea?  No.    Have you been told you have COPD, asthma, or any other lung disease?  No    Do you have any heart conditions?  No     Have you ever had or are you waiting for an organ transplant?  No. Continue scheduling, no site restrictions.    Have you had a stroke or transient ischemic attack (TIA aka \"mini stroke\" in the last 6 months?   No    Have you been diagnosed with or been told you have cirrhosis of the liver?   No.    Are you currently on dialysis?   No    Do you need assistance transferring?   No    BMI: Estimated body mass index is 27.38 kg/m  as calculated from the following:    Height as of 8/22/24: 1.753 m (5' 9\").    Weight as of 8/22/24: 84.1 kg (185 lb " 6.4 oz).     Is patients BMI > 40 and scheduling location [facility]?  No    Do you take an injectable or oral medication for weight loss or diabetes (excluding insulin)?  No    Do you take the medication Naltrexone?  No    Do you take blood thinners?  No       Prep   Are you currently on dialysis or do you have chronic kidney disease?  No    Do you have a diagnosis of diabetes?  No    Do you have a diagnosis of cystic fibrosis (CF)?  No    On a regular basis do you go 3 -5 days between bowel movements?  No    BMI > 40?  No    Preferred Pharmacy:    [facility]/pharmacy #[id number] - [place], [place] - [address]  [address] [address]  Phone: [phone] Fax: [phone]    Final Scheduling Details     Procedure scheduled  Colonoscopy    Surgeon:       Date of procedure:  3/24/25     Pre-OP / PAC:   No - Not required for this site.    Location  [facility] - Per order.    Sedation   Moderate Sedation - Per order.      Patient Reminders:   You will receive a call from a Nurse to review instructions and health history.  This assessment must be completed prior to your procedure.  Failure to complete the Nurse assessment may result in the procedure being cancelled.      On the day of your procedure, please designate an adult(s) who can drive you home stay with you for the next 24 hours. The medicines used in the exam will make you sleepy. You will not be able to drive.      You cannot take public transportation, ride share services, or non-medical taxi service without a responsible caregiver.  Medical transport services are allowed with the requirement that a responsible caregiver will receive you at your destination.  We require that drivers and caregivers are confirmed prior to your procedure.

## 2025-02-18 ENCOUNTER — HOSPITAL ENCOUNTER (OUTPATIENT)
Facility: CLINIC | Age: 45
Setting detail: OBSERVATION
Discharge: HOME | End: 2025-02-19
Attending: EMERGENCY MEDICINE | Admitting: EMERGENCY MEDICINE
Payer: COMMERCIAL

## 2025-02-18 DIAGNOSIS — F41.9 ANXIETY: ICD-10-CM

## 2025-02-18 DIAGNOSIS — F90.9 ATTENTION DEFICIT HYPERACTIVITY DISORDER (ADHD), UNSPECIFIED ADHD TYPE: ICD-10-CM

## 2025-02-18 DIAGNOSIS — Z63.8 FAMILY CONFLICT: ICD-10-CM

## 2025-02-18 PROBLEM — F32.A DEPRESSION: Status: ACTIVE | Noted: 2025-02-18

## 2025-02-18 PROCEDURE — 96372 THER/PROPH/DIAG INJ SC/IM: CPT | Performed by: EMERGENCY MEDICINE

## 2025-02-18 PROCEDURE — 250N000011 HC RX IP 250 OP 636: Mod: JZ | Performed by: EMERGENCY MEDICINE

## 2025-02-18 PROCEDURE — 99285 EMERGENCY DEPT VISIT HI MDM: CPT

## 2025-02-18 RX ORDER — LORAZEPAM 1 MG/1
1 TABLET ORAL EVERY 8 HOURS PRN
Status: DISCONTINUED | OUTPATIENT
Start: 2025-02-18 | End: 2025-02-19 | Stop reason: HOSPADM

## 2025-02-18 RX ORDER — CHLORAL HYDRATE 500 MG
1 CAPSULE ORAL DAILY
COMMUNITY

## 2025-02-18 RX ORDER — VITAMIN B COMPLEX
1 TABLET ORAL DAILY
COMMUNITY

## 2025-02-18 RX ORDER — OLANZAPINE 10 MG/2ML
10 INJECTION, POWDER, FOR SOLUTION INTRAMUSCULAR DAILY PRN
Status: DISCONTINUED | OUTPATIENT
Start: 2025-02-18 | End: 2025-02-19 | Stop reason: HOSPADM

## 2025-02-18 RX ORDER — LANOLIN ALCOHOL/MO/W.PET/CERES
1000 CREAM (GRAM) TOPICAL DAILY
COMMUNITY

## 2025-02-18 RX ORDER — FERROUS SULFATE 325(65) MG
325 TABLET ORAL
COMMUNITY

## 2025-02-18 RX ORDER — OLANZAPINE 10 MG/1
10 TABLET, ORALLY DISINTEGRATING ORAL 2 TIMES DAILY PRN
Status: DISCONTINUED | OUTPATIENT
Start: 2025-02-18 | End: 2025-02-19 | Stop reason: HOSPADM

## 2025-02-18 RX ADMIN — OLANZAPINE 10 MG: 10 INJECTION, POWDER, FOR SOLUTION INTRAMUSCULAR at 15:05

## 2025-02-18 SDOH — SOCIAL STABILITY - SOCIAL INSECURITY: OTHER SPECIFIED PROBLEMS RELATED TO PRIMARY SUPPORT GROUP: Z63.8

## 2025-02-18 ASSESSMENT — ACTIVITIES OF DAILY LIVING (ADL)
ADLS_ACUITY_SCORE: 41

## 2025-02-18 NOTE — ED NOTES
Patient yelling and screaming wanting to follow her , a code 21 was called.    Patient was given Olanzapine .  She was searches an all her belongings are in the closet.

## 2025-02-18 NOTE — DISCHARGE INSTRUCTIONS
A virtual therapy appointment has been scheduled with the [facility]:   You will receive the link to the session via text. If you have any questions, please reach out to the [facility], [phone].     Date: 2/20/2025  Time: 9:30 AM  Length:60  Visit Type: ADULT PSYCHOTHERAPY   Provider: Aurea Navarrete [facility]    Department Phone: [phone]

## 2025-02-18 NOTE — ED PROVIDER NOTES
Emergency Department Note      History of Present Illness     Chief Complaint   Suicidal ideation    HPI   Gayle Couch is a 43 year old female presenting to the ED for the evaluation of a mental health concern. The patient reports that she was concerned about her  having an affair and made a phone call to him stating that if he did not come home immediately she would kill herself. Her  wanted her to be seen in the ED and called 911, but Gayle refused to leave with police until her  arrived home. Police report that she was aggressive and refused to allow them to see under the bandages on her left arm, and she told them that her cat had scratched her yesterday. The patient denies any plans to commit suicide, stating that she only threatened self harm to get her  to come home. She is currently on an ADHD medication and has a history of taking medications for depression and anxiety. She denies drug or alcohol use. She also denies recent illness though notes that she has been experiencing lower energy lately.    Independent Historian   Police as detailed above.    Review of External Notes   None    Past Medical History     Medical History and Problem List   No past medical history on file.    Medications   No current outpatient medications on file.    Physical Exam     Physical Exam  Nursing note and vitals reviewed.  Constitutional:  Awake and alert.   HENT:   Nose:    Nose normal.   Mouth/Throat:   Mucous membranes are normal.   Eyes:    Conjunctivae normal and EOM are normal.      Pupils are equal, round, and reactive to light.   Neck:    Trachea normal.   Cardiovascular:  Tachycardic rate, regular rhythm, normal heart sounds and normal pulses. No murmur heard.  Pulmonary/Chest:  Effort normal and breath sounds normal.   Abdominal:   Soft. Normal appearance and bowel sounds are normal.      There is no tenderness.      There is no rebound and no CVA tenderness.   Musculoskeletal:   Extremities atraumatic x 4.   Lymphadenopathy:  No cervical adenopathy.   Neurological:   Awake and alert. Normal strength.      No cranial nerve deficit or sensory deficit. GCS eye subscore is 4. GCS verbal subscore is 5. GCS motor subscore is 6.   Skin:    Superficial excoriations to the left wrist. No rash noted.   Psychiatric:   Very anxious with vague suicidal thoughts and at times agitated.      Diagnostics     Lab Results   Labs Ordered and Resulted from Time of ED Arrival to Time of ED Departure - No data to display    Imaging   No orders to display         Independent Interpretation   None    ED Course      Medications Administered   Medications   OLANZapine (zyPREXA) injection 10 mg (10 mg Intramuscular $Given 2/18/25 150)       Procedures   Procedures     Discussion of Management   ED [facility], [facility]    ED Course   ED Course as of 02/18/25 1818 Tue Feb 18, 2025   1400 I obtained the history and examined the patient as noted above.      1447 I rechecked and updated the patient.          Additional Documentation  Social Determinants of Health: Stress/Adjustment Disorders     Medical Decision Making / Diagnosis     CMS Diagnoses: None    MIPS       None    MDM   Gayle Couch is a 43 year old female who was brought in on a hold due to suicidal statements.  She actually was very difficult to obtain a history from, and she at times became quite agitated.  I did not feel that she required any type of medical workup, but I did want her to undergo a DEC assessment.  Unfortunately, her  had to leave at one point and she then became extremely agitated and a code 21 was called.  She required IM Zyprexa as well to calm her down.  Ultimately, she was able to undergo a DEC assessment, and the DEC  also spoke with the patient's .  Initially, the DEC  thought that the patient might be able to be discharged.  However unfortunately she does not contract for safety or even agree that she  needs a safety plan at this point, denying that there is even any type of problem.  Therefore I am placing her on a 72-hour hold for now and will be signing her out to my colleague, Dr. Valentine.    Disposition   The patient was signed out to my colleague, Dr. Valentine.     Diagnosis     ICD-10-CM    1. Suicidal ideation  R45.851                    Scribe Disclosure:  I, Elicia Quintero, am serving as a scribe at 2:25 PM on 2/18/2025 to document services personally performed by Marshall Marcus MD based on my observations and the provider's statements to me.        Marshall Marcus MD  02/18/25 1820       Marshall Marcus MD  02/18/25 1825

## 2025-02-18 NOTE — ED NOTES
"Patient is argumentative with her  in room. She reports he is cheating, she said,\" I just called to let him know I was going to cut my self, report she has scratches  on her arm and she was scratched by a cat but the Dr does not believe that.  "
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
## 2025-02-19 ENCOUNTER — TELEPHONE (OUTPATIENT)
Dept: BEHAVIORAL HEALTH | Facility: CLINIC | Age: 45
End: 2025-02-19

## 2025-02-19 VITALS
HEART RATE: 82 BPM | DIASTOLIC BLOOD PRESSURE: 78 MMHG | RESPIRATION RATE: 18 BRPM | HEIGHT: 70 IN | OXYGEN SATURATION: 99 % | BODY MASS INDEX: 31.81 KG/M2 | WEIGHT: 222.2 LBS | TEMPERATURE: 97.8 F | SYSTOLIC BLOOD PRESSURE: 116 MMHG

## 2025-02-19 PROBLEM — R45.851 SUICIDAL IDEATION: Status: ACTIVE | Noted: 2025-02-19

## 2025-02-19 LAB
AMPHETAMINES UR QL SCN: ABNORMAL
BARBITURATES UR QL SCN: ABNORMAL
BENZODIAZ UR QL SCN: ABNORMAL
BZE UR QL SCN: ABNORMAL
CANNABINOIDS UR QL SCN: ABNORMAL
FENTANYL UR QL: ABNORMAL
HCG UR QL: NEGATIVE
OPIATES UR QL SCN: ABNORMAL
PCP QUAL URINE (ROCHE): ABNORMAL

## 2025-02-19 PROCEDURE — 80307 DRUG TEST PRSMV CHEM ANLYZR: CPT | Performed by: STUDENT IN AN ORGANIZED HEALTH CARE EDUCATION/TRAINING PROGRAM

## 2025-02-19 PROCEDURE — 99222 1ST HOSP IP/OBS MODERATE 55: CPT | Mod: AI | Performed by: NURSE PRACTITIONER

## 2025-02-19 PROCEDURE — 81025 URINE PREGNANCY TEST: CPT | Performed by: STUDENT IN AN ORGANIZED HEALTH CARE EDUCATION/TRAINING PROGRAM

## 2025-02-19 PROCEDURE — 250N000013 HC RX MED GY IP 250 OP 250 PS 637: Performed by: STUDENT IN AN ORGANIZED HEALTH CARE EDUCATION/TRAINING PROGRAM

## 2025-02-19 PROCEDURE — G0378 HOSPITAL OBSERVATION PER HR: HCPCS

## 2025-02-19 RX ORDER — FERROUS SULFATE 325(65) MG
325 TABLET ORAL DAILY
Status: DISCONTINUED | OUTPATIENT
Start: 2025-02-19 | End: 2025-02-19 | Stop reason: HOSPADM

## 2025-02-19 RX ORDER — ONDANSETRON 4 MG/1
4 TABLET, ORALLY DISINTEGRATING ORAL EVERY 6 HOURS PRN
Status: DISCONTINUED | OUTPATIENT
Start: 2025-02-19 | End: 2025-02-19 | Stop reason: HOSPADM

## 2025-02-19 RX ORDER — HYDROXYZINE HYDROCHLORIDE 50 MG/1
50 TABLET, FILM COATED ORAL EVERY 6 HOURS PRN
Status: DISCONTINUED | OUTPATIENT
Start: 2025-02-19 | End: 2025-02-19 | Stop reason: HOSPADM

## 2025-02-19 RX ORDER — ACETAMINOPHEN 325 MG/1
650 TABLET ORAL EVERY 4 HOURS PRN
Status: DISCONTINUED | OUTPATIENT
Start: 2025-02-19 | End: 2025-02-19 | Stop reason: HOSPADM

## 2025-02-19 RX ORDER — TRAZODONE HYDROCHLORIDE 50 MG/1
50 TABLET ORAL
Status: DISCONTINUED | OUTPATIENT
Start: 2025-02-19 | End: 2025-02-19 | Stop reason: HOSPADM

## 2025-02-19 RX ADMIN — FERROUS SULFATE TAB 325 MG (65 MG ELEMENTAL FE) 325 MG: 325 (65 FE) TAB at 08:29

## 2025-02-19 ASSESSMENT — COLUMBIA-SUICIDE SEVERITY RATING SCALE - C-SSRS
TOTAL  NUMBER OF INTERRUPTED ATTEMPTS SINCE LAST CONTACT: NO
6. HAVE YOU EVER DONE ANYTHING, STARTED TO DO ANYTHING, OR PREPARED TO DO ANYTHING TO END YOUR LIFE?: NO
ATTEMPT SINCE LAST CONTACT: NO
1. SINCE LAST CONTACT, HAVE YOU WISHED YOU WERE DEAD OR WISHED YOU COULD GO TO SLEEP AND NOT WAKE UP?: NO
TOTAL  NUMBER OF ABORTED OR SELF INTERRUPTED ATTEMPTS SINCE LAST CONTACT: NO
2. HAVE YOU ACTUALLY HAD ANY THOUGHTS OF KILLING YOURSELF?: NO
SUICIDE, SINCE LAST CONTACT: NO

## 2025-02-19 ASSESSMENT — ACTIVITIES OF DAILY LIVING (ADL)
ADLS_ACUITY_SCORE: 41

## 2025-02-19 NOTE — ED NOTES
Patient agrees to discharge plan. Discharge instructions reviewed with patient including follow-up care plan. Medications: no medication changes made at this time. Reviewed safety plan and outpatient resources. Denies SI and HI. All belongings that were brought into the hospital have been returned to patient. Escorted off the unit at 0923 accompanied by [facility] staff. Discharged to home via transportation by .

## 2025-02-19 NOTE — ED PROVIDER NOTES
Patient signed out to me pending repeat DEC in am, hope it that patient will improve and be able to be discharged tomorrow  Patient now calm and appropriate for Raina Aponte MD  02/19/25 0016

## 2025-02-19 NOTE — PROGRESS NOTES
[facility] right [facility] She woke up this morning and used the bathroom. Urine sample collected for screening. Patient is back in the recliner resting.

## 2025-02-19 NOTE — ED NOTES
Bed: [facility]  Expected date:   Expected time:   Means of arrival:   Comments:  1 RN

## 2025-02-19 NOTE — TELEPHONE ENCOUNTER
Received call from patient, stating that she was scheduled on the day she will not be available. Pt requesting scheduled on a different day.    Writer was able to scheduled patient for next Monday 2/24 with the same provider per pt's request.    Writer was also able to receive virtual general consent from patient.    Patient requested link to be sent both to her phone and email.     Sarah Raya  [facility] Coordinator  02/19/25 11:53 AM

## 2025-02-19 NOTE — ED NOTES
[facility]  ED to [facility] Checklist:      Goal for [facility]: Suicidality    Current Behavior: Calm    Safety Concerns: Suicidal, no plan    Legal Hold Status: 72 Hrs    Medically Cleared by ED provider: Yes    Patient Therapeutically Searched: Therapeutic search by ED staff (strings, belts, shoes, pockets, electronics, etc.)    Belongings: In room locker    Independent Ambulation at Baseline: Yes/No: Yes    Participates in Care/Conversation: Yes/No: Yes    Patient Informed about [facility]: Yes/No: Yes    DEC: Ordered and pending re evaluation in AM    Patient Ready to be Transferred to [facility]? Yes/No: Yes

## 2025-02-19 NOTE — ED NOTES
Pt  is here; explained that if the situation escalates as it previously did, he will not be able to stay.  Pt and  receptive to this.

## 2025-02-19 NOTE — PROGRESS NOTES
44 year old female with history of ADHD received from ED due to suicide ideation. Reports being angry because she thought the  was cheating on her and she wanted him to come home, so she threatened killing herself if he did not come back. She endorsed  SI earlier with no plans. Denies HI, no hallucination. Patient said she feels better and has no anxiety or feeling of depression at this time. Nursing and risk assessments completed. Assessments with [facility] and physician needed. Admission information reviewed with patient. Patient given a tour of [facility] and instructions on using the facility. Questions regarding [facility] addressed. Pt safety search completed.      Patient noted that in addition to her daily vitamins, she was started on medication for ADHD that starts with the letter D but she does not quite know the name. Medication not seen in the home med list either.     [place] origine but denies interpreting services.

## 2025-02-19 NOTE — PROGRESS NOTES
"Triage and Transition Services Extended Care Reassessment     Patient: Vane goes by \"Vane,\" uses she/her pronouns  Date of Service: February 19, 2025  Site of Service: [facility] Emergency Dept                               Patient was seen yes  Mode of Assessment: In person     Reason for Reassessment: other (see comment) (anticipating discharge)    History of Patient's Original Emergency Room Encounter: Pt is 44 year old female, , living with her  in an apartment. Pt reports she is originally from [facility], and has been in [place] for about 20 years. Pt reports she has minimal supports, noting her only support is her . Pt initially reports she has previously been diagnosed with ADHD, depression, and anxiety; Pt reports one previous suicide attempt via intentional overdose in 2022, while on a cruise with her then fiance and his son. Pt was hospitalized on the ship for three days as she had seizures following the overdose (took 15 wellbutrin medications). Pt was seen at [facility] in January 2023, able to meet with providers, be connected with resources, safety plan and discharge same day. Pt does not currently have an outpatient therapist, but reports she has an outpatient psychiatrist. Pt also reports there were recent medication changes within the past two weeks, with Pt likely starting Guanfacine (Pt does not recall specific medications). Pt reports she also stopped antidepressants a few weeks ago because of adverse side effects; pt indicates many past trials of medications but does not recall specific medications. Per collateral, Pt has a history of behavioral dyscontrol and and challenging interpersonal relationships. Pt was previously  with three kids; Pt's ex- gained custody of all three children due to Pt displaying similar behaviors as she has recently. There is no records of MI/CD civil commitment.    Current Patient Presentation: Pt is calm, cooperative, " and engaged with Writer during therapeutic check-in.    Presentation Summary: Writer approached Pt to engage in therapeutic check-in. Writer introduced self and stated purpose of interaction and Pt was agreeable to meet. Writer identified being informed by Pt's RN she was hoping to discharge. Pt confirms she would like to discharge and attend her psychiatric provider appointment later today at 1330. When prompted by Writer, Pt denies any SI, HI, AH/VH and denies any safety concerns. Writer identified process of meeting with the attending psychiatric provider prior to discharge to which Pt was agreeable. Writer and Pt engaged in and completed an aftercare/safety plan. When discussing how Pt would return home, Pt reports she would like to have her  come and pick her up. Writer identified he will coordinate with care team to attempt to meet Pt's requests and preferences.    Changes Observed Since Initial Assessment: decrease in presenting symptoms, patient/family request    Therapeutic Interventions Provided: Engaged in safety planning, Engaged in social skills training., Engaged in guided discovery, explored patient's perspectives and helped expand them through socratic dialogue.    Current Symptoms: anxious (hoping to discharge)   anxious        Mental Status Exam   Affect: Appropriate  Appearance: Appropriate  Attention Span/Concentration: Attentive  Eye Contact: Engaged    Fund of Knowledge: Appropriate   Language /Speech Content: Fluent  Language /Speech Volume: Normal  Language /Speech Rate/Productions: Normal  Recent Memory: Intact  Remote Memory: Intact  Mood: Normal  Orientation to Person: Yes   Orientation to Place: Yes  Orientation to Time of Day: Yes  Orientation to Date: Yes     Situation (Do they understand why they are here?): Yes  Psychomotor Behavior: Normal  Thought Content: Clear  Thought Form: Intact    Treatment Objective(s) Addressed: rapport building, identifying an appropriate aftercare  plan, identifying additional supports, safety planning, assessing safety, identifying treatment goals    Patient Response to Interventions: acceptance expressed, verbalizes understanding    Progress Towards Goals:  Patient Progress Toward Goals: is making progress  Comment: Pt is calm, cooperative, and engaged with Writer during therapeutic check-in.  Next Step to Work Toward Discharge: symptom stabilization, patient ability to engage in safety planning  Symptom Stabilization Comment: Pt also denies any SI, HI, AH/VH.  Ability to Engage Comment: Writer and Pt engaged in and completed an aftercare/safety plan.    Case Management: Summary of Interaction: NA at this time of therapeutic check-in.    C-SSRS Since Last Contact:   1. Wish to be Dead (Since Last Contact): No  2. Non-Specific Active Suicidal Thoughts (Since Last Contact): No     Actual Attempt (Since Last Contact): No  Has subject engaged in non-suicidal self-injurious behavior? (Since Last Contact): No  Interrupted Attempts (Since Last Contact): No  Aborted or Self-Interrupted Attempt (Since Last Contact): No  Preparatory Acts or Behavior (Since Last Contact): No  Suicide (Since Last Contact): No     Calculated C-SSRS Risk Score (Since Last Contact): No Risk Indicated    Plan: Final Disposition / Recommended Care Path: discharge  Plan for Care reviewed with assigned Medical Provider: yes  Plan for Care Team Review: RN, provider  Comments: Hawk Guardado NP  Patient and/or validated legal guardian concurs: yes  Clinical Substantiation:     Writer approached Pt to engage in therapeutic check-in. Writer introduced self and stated purpose of interaction and Pt was agreeable to meet. Writer identified being informed by Pt's RN she was hoping to discharge. Pt confirms she would like to discharge and attend her psychiatric provider appointment later today at 1330. When prompted by Writer, Pt denies any SI, HI, AH/VH and denies any safety concerns. Writer  identified process of meeting with the attending psychiatric provider prior to discharge to which Pt was agreeable. Writer and Pt engaged in and completed an aftercare/safety plan. When discussing how Pt would return home, Pt reports she would like to have her  come and pick her up. Writer identified he will coordinate with care team to attempt to meet Pt's requests and preferences.    At this time IP MH admission is not being recommended due to denial of SI, HI, AH/VH. Pt was able to safety plan with Writer. Pt's current symptoms and presentation appear to be at Pt's baseline. Pt does appear to be at higher risk of death by suicide accidental or intentional due to mental health history. At this time IP MH admission does not appear to be the most therapeutically beneficial intervention/ level of care for Pt. Pt appears to be able to use and motivated to engage in supportive mental health/ community resources.       Legal Status: Legal Status: 72 Hour Hold  72 Hour Hold - Date/Time Initiated: 2/18/25 @ 1827  72 Hour Hold - Date/Time Ends: 2/21/25 @1827    Session Status: Time session started: 0815  Time session ended: 0827  Session Duration (minutes): 12 minutes  Session Number: 1  Anticipated number of sessions or this episode of care: 1    Session Start Time: 0815  Session Stop Time: 0827  CPT codes: Non-Billable  Time Spent: 12 minutes      CPT code(s) utilized: Non-Billable    Diagnosis:   Patient Active Problem List   Diagnosis Code    Depression F32.A    Anxiety F41.9    Suicidal ideation R45.851       Primary Problem This Admission: Active Hospital Problems    Suicidal ideation      Depression      Anxiety        Enio Reid, [facility]   Licensed Mental Health Professional (LMHP), Extended Care  [phone]
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
## 2025-02-19 NOTE — ED NOTES
Attempted to vital the patient, pt quickly refused and stated she won't do anything until she is let go.

## 2025-02-19 NOTE — PHARMACY-ADMISSION MEDICATION HISTORY
Pharmacist Admission Medication History    Admission medication history is complete. The information provided in this note is only as accurate as the sources available at the time of the update.    Information Source(s): Patient and CareEverywhere/SureScripts via in-person    Pertinent Information: Vane stated she is no longer taking Lexapro or Adderall. She has a provider appointment tomorrow morning to trial a new ADHD medication.     Changes made to PTA medication list:  Added: All  Deleted: None  Changed: None    Allergies reviewed with patient and updates made in EHR: yes    Medication History Completed By: Noel Tolliver [facility] 2/18/2025 10:12 PM    PTA Med List   Medication Sig Last Dose/Taking    cyanocobalamin (VITAMIN B-12) 1000 MCG tablet Take 1,000 mcg by mouth daily. Taking    ferrous sulfate (FEROSUL) 325 (65 Fe) MG tablet Take 325 mg by mouth daily (with breakfast). Taking    fish oil-omega-3 fatty acids 1000 MG capsule Take 1 g by mouth daily. Taking    Vitamin D3 (CHOLECALCIFEROL) 25 mcg (1000 units) tablet Take 1 tablet by mouth daily. Taking
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
## 2025-02-21 ENCOUNTER — OFFICE VISIT (OUTPATIENT)
Dept: FAMILY MEDICINE | Facility: CLINIC | Age: 45
End: 2025-02-21
Payer: COMMERCIAL

## 2025-02-21 ENCOUNTER — MYC MEDICAL ADVICE (OUTPATIENT)
Dept: FAMILY MEDICINE | Facility: CLINIC | Age: 45
End: 2025-02-21

## 2025-02-21 VITALS
HEIGHT: 69 IN | HEART RATE: 80 BPM | BODY MASS INDEX: 32.85 KG/M2 | TEMPERATURE: 97.7 F | DIASTOLIC BLOOD PRESSURE: 79 MMHG | WEIGHT: 221.8 LBS | OXYGEN SATURATION: 95 % | SYSTOLIC BLOOD PRESSURE: 127 MMHG | RESPIRATION RATE: 18 BRPM

## 2025-02-21 DIAGNOSIS — E53.8 VITAMIN B12 DEFICIENCY (NON ANEMIC): ICD-10-CM

## 2025-02-21 DIAGNOSIS — E55.9 VITAMIN D DEFICIENCY: ICD-10-CM

## 2025-02-21 DIAGNOSIS — F33.1 MODERATE EPISODE OF RECURRENT MAJOR DEPRESSIVE DISORDER (H): ICD-10-CM

## 2025-02-21 DIAGNOSIS — E66.09 CLASS 1 OBESITY DUE TO EXCESS CALORIES WITHOUT SERIOUS COMORBIDITY WITH BODY MASS INDEX (BMI) OF 32.0 TO 32.9 IN ADULT: ICD-10-CM

## 2025-02-21 DIAGNOSIS — E66.811 CLASS 1 OBESITY DUE TO EXCESS CALORIES WITHOUT SERIOUS COMORBIDITY WITH BODY MASS INDEX (BMI) OF 32.0 TO 32.9 IN ADULT: ICD-10-CM

## 2025-02-21 DIAGNOSIS — L65.8 FEMALE PATTERN HAIR LOSS: ICD-10-CM

## 2025-02-21 DIAGNOSIS — F90.9 ATTENTION DEFICIT HYPERACTIVITY DISORDER (ADHD), UNSPECIFIED ADHD TYPE: ICD-10-CM

## 2025-02-21 DIAGNOSIS — R53.83 OTHER FATIGUE: ICD-10-CM

## 2025-02-21 DIAGNOSIS — E61.1 IRON DEFICIENCY: ICD-10-CM

## 2025-02-21 LAB
FERRITIN SERPL-MCNC: 246 NG/ML (ref 6–175)
IRON BINDING CAPACITY (ROCHE): 273 UG/DL (ref 240–430)
IRON SATN MFR SERPL: 51 % (ref 15–46)
IRON SERPL-MCNC: 139 UG/DL (ref 37–145)
VIT B12 SERPL-MCNC: 751 PG/ML (ref 232–1245)
VIT D+METAB SERPL-MCNC: 28 NG/ML (ref 20–50)

## 2025-02-21 PROCEDURE — 82306 VITAMIN D 25 HYDROXY: CPT

## 2025-02-21 PROCEDURE — 83550 IRON BINDING TEST: CPT

## 2025-02-21 PROCEDURE — 82607 VITAMIN B-12: CPT

## 2025-02-21 PROCEDURE — 82728 ASSAY OF FERRITIN: CPT

## 2025-02-21 PROCEDURE — 99214 OFFICE O/P EST MOD 30 MIN: CPT

## 2025-02-21 PROCEDURE — 83540 ASSAY OF IRON: CPT

## 2025-02-21 PROCEDURE — 36415 COLL VENOUS BLD VENIPUNCTURE: CPT

## 2025-02-21 RX ORDER — NALTREXONE HYDROCHLORIDE 50 MG/1
25 TABLET, FILM COATED ORAL DAILY
Status: CANCELLED | OUTPATIENT
Start: 2025-02-21

## 2025-02-21 RX ORDER — ESCITALOPRAM OXALATE 20 MG/1
TABLET ORAL
COMMUNITY
Start: 2025-02-19

## 2025-02-21 RX ORDER — DEXTROAMPHETAMINE SULFATE 15 MG/1
CAPSULE, EXTENDED RELEASE ORAL
COMMUNITY
Start: 2025-02-19

## 2025-02-21 RX ORDER — BUPROPION HYDROCHLORIDE 150 MG/1
150 TABLET ORAL EVERY MORNING
Qty: 90 TABLET | Refills: 0 | Status: SHIPPED | OUTPATIENT
Start: 2025-02-21 | End: 2025-02-23

## 2025-02-21 RX ORDER — MINOXIDIL 2.5 MG/1
2.5 TABLET ORAL DAILY
Qty: 90 TABLET | Refills: 1 | Status: SHIPPED | OUTPATIENT
Start: 2025-02-21

## 2025-02-21 ASSESSMENT — PATIENT HEALTH QUESTIONNAIRE - PHQ9
SUM OF ALL RESPONSES TO PHQ QUESTIONS 1-9: 18
10. IF YOU CHECKED OFF ANY PROBLEMS, HOW DIFFICULT HAVE THESE PROBLEMS MADE IT FOR YOU TO DO YOUR WORK, TAKE CARE OF THINGS AT HOME, OR GET ALONG WITH OTHER PEOPLE: EXTREMELY DIFFICULT
SUM OF ALL RESPONSES TO PHQ QUESTIONS 1-9: 18

## 2025-02-21 ASSESSMENT — PAIN SCALES - GENERAL: PAINLEVEL_OUTOF10: NO PAIN (0)

## 2025-02-21 NOTE — PROGRESS NOTES
Assessment & Plan     Moderate episode of recurrent major depressive disorder (H)  Working with psychiatrist to help manage symptoms and medications. Currently on Lexapro. Discussed starting bupropion to help with symptoms as well as possible weight loss, however this will be added through Contrave.    Attention deficit hyperactivity disorder (ADHD), unspecified ADHD type  Working with psychiatrist to help manage symptoms. On dextroamphetamine     Female pattern hair loss  Has been using topical minoxidil without much success. Discussed that it can take several months to start noticing results. Is interested in PO option so discussed PO minoxidil, including risks and benefits as well as side effects. Plan to follow-up in 3 months to reassess  - minoxidil (LONITEN) 2.5 MG tablet; Take 1 tablet (2.5 mg) by mouth daily.    Iron deficiency  Hx of iron deficiency, on supplementation. Check labs for stability.  - Ferritin  - Iron & Iron Binding Capacity    Other fatigue  Continues to have fatigue. On supplementation with improvement in iron and vitamin B12 & D levels. Discussed depression and other mental health factors that can contribute to ongoing fatigue. Continues to work with psychiatrist for med management. Check other labs for stability   - Ferritin  - Iron & Iron Binding Capacity  - Vitamin D Deficiency  - Vitamin B12    Vitamin B12 deficiency (non anemic)  On supplementation, check labs for stability  - Vitamin B12    Vitamin D deficiency  On supplementation, check labs for stability  - Vitamin D Deficiency  - vitamin D3 (CHOLECALCIFEROL) 50 mcg (2000 units) tablet; Take 1 tablet (50 mcg) by mouth daily.    Class 1 obesity due to excess calories without serious comorbidity with body mass index (BMI) of 32.0 to 32.9 in adult  Discussed available weight loss options, including injectables and PO options. Discussed increasing physical activity to include strength training and increasing protein intake to at least  "80 g/day. Encouraged to check with insurance on what is covered so she will look into that. Ultimately, she is hoping for something to help with cravings. Considered phentermine, however is currently taking stimulant medication. Discussed Contrave as bupoprion may have some mental health benefit. Discussed medication risks/benefits/side effects. Patient interested in trialing medication to see if this helps with symptoms. Will follow-up within 3 months to reassess progress and medication tolerance.  - naltrexone-bupropion (CONTRAVE) 8-90 MG per 12 hr tablet; Take 1 tablet by mouth daily.        MED REC REQUIRED  Post Medication Reconciliation Status: discharge medications reconciled and changed, per note/orders  BMI  Estimated body mass index is 32.3 kg/m  as calculated from the following:    Height as of this encounter: 1.765 m (5' 9.49\").    Weight as of this encounter: 100.6 kg (221 lb 12.8 oz).   Weight management plan: Discussed healthy diet and exercise guidelines    Depression Screening Follow Up        2/21/2025     8:05 AM   PHQ   PHQ-9 Total Score 18    Q9: Thoughts of better off dead/self-harm past 2 weeks More than half the days   F/U: Thoughts of suicide or self-harm Yes   F/U: Self harm-plan Yes   F/U: Self-harm action No   F/U: Safety concerns No       Patient-reported         2/21/2025     8:05 AM   Last PHQ-9   1.  Little interest or pleasure in doing things 2   2.  Feeling down, depressed, or hopeless 2   3.  Trouble falling or staying asleep, or sleeping too much 1   4.  Feeling tired or having little energy 3   5.  Poor appetite or overeating 3   6.  Feeling bad about yourself 2   7.  Trouble concentrating 3   8.  Moving slowly or restless 0   Q9: Thoughts of better off dead/self-harm past 2 weeks 2   PHQ-9 Total Score 18    In the past two weeks have you had thoughts of suicide or self harm? Yes   Do you have concerns about your personal safety or the safety of others? No   In the past 2 weeks " have you thought about a plan or had intention to harm yourself? Yes   In the past 2 weeks have you acted on these thoughts in any way? No       Patient-reported                No data to display                    Follow Up Actions Taken  Crisis resource information provided in the After Visit Summary  Referred patient back to mental health provider    Discussed the following ways the patient can remain in a safe environment:  dispose of old medications , remove things I could use to hurt myself:  , and be around others    MEDICATIONS:        - Trial of Contrave       - Continue other medications without change  FUTURE APPOINTMENTS:       - Follow-up visit in 3 months       - Follow-up for annual visit or as needed  Work on weight loss  Regular exercise    Subjective   Vane is a 44 year old, presenting for the following health issues:  Fatigue    Wanting to discuss weight loss options  Notices increase in cravings, feels that food has been comforting. Historically has enjoyed exercise and engaged in more exercise and had some success with that, however has started school and has not had as much time to exercise. Does about 15 minutes of yoga and 5 minutes of stretching daily. Has been trying to eliminate sweets and sugar and generally has not had much success. Notes a ~60# weight gain over the past couple years due to various life events and stressors  Continues to have ongoing fatigue, has been working with psychiatry on ADHD medications  Noticing hair loss as well      History of Present Illness       Reason for visit:  Very low energy large weight gain hair loss She is missing 2 dose(s) of medications per week.         ED/UC Followup:    Facility:  [facility] Emergency Dept  Date of visit: 2/18/25  Reason for visit: Attention deficit hyperactivity disorder (ADHD), unspecified ADHD type   Anxiety   Family conflict   Current Status: Stable        Review of Systems  Constitutional, HEENT,  "cardiovascular, pulmonary, gi and gu systems are negative, except as otherwise noted.      Objective    /79 (BP Location: Right arm, Patient Position: Sitting, Cuff Size: Adult Regular)   Pulse 80   Temp 97.7  F (36.5  C) (Temporal)   Resp 18   Ht 1.765 m (5' 9.49\")   Wt 100.6 kg (221 lb 12.8 oz)   LMP 02/20/2025   SpO2 95%   BMI 32.30 kg/m    Body mass index is 32.3 kg/m .  Physical Exam  Vitals reviewed.   Constitutional:       Appearance: Normal appearance.   HENT:      Head: Normocephalic.   Eyes:      Pupils: Pupils are equal, round, and reactive to light.   Cardiovascular:      Rate and Rhythm: Normal rate.   Pulmonary:      Effort: Pulmonary effort is normal. No respiratory distress.   Musculoskeletal:         General: Normal range of motion.   Skin:     General: Skin is warm.   Neurological:      Mental Status: She is alert and oriented to person, place, and time.   Psychiatric:         Mood and Affect: Mood normal.         Behavior: Behavior normal.         Thought Content: Thought content normal.         Judgment: Judgment normal.                34 minutes spent on the date of encounter doing chart review, history and exam, documentation, and further activities per the note.          Signed Electronically by: Mireya Gonzales, DNP, APRN, CNP    "

## 2025-02-23 RX ORDER — CHOLECALCIFEROL (VITAMIN D3) 50 MCG
1 TABLET ORAL DAILY
Qty: 90 TABLET | Refills: 3 | Status: SHIPPED | OUTPATIENT
Start: 2025-02-23

## 2025-02-24 ENCOUNTER — VIRTUAL VISIT (OUTPATIENT)
Dept: BEHAVIORAL HEALTH | Facility: CLINIC | Age: 45
End: 2025-02-24
Payer: COMMERCIAL

## 2025-02-24 DIAGNOSIS — F33.1 MAJOR DEPRESSIVE DISORDER, RECURRENT EPISODE, MODERATE (H): Primary | ICD-10-CM

## 2025-02-24 DIAGNOSIS — F90.0 ADHD (ATTENTION DEFICIT HYPERACTIVITY DISORDER), INATTENTIVE TYPE: ICD-10-CM

## 2025-02-24 DIAGNOSIS — F41.1 GENERALIZED ANXIETY DISORDER: ICD-10-CM

## 2025-02-24 PROCEDURE — 99207 PR NO CHARGE LOS: CPT | Mod: 95 | Performed by: SOCIAL WORKER

## 2025-02-24 ASSESSMENT — PATIENT HEALTH QUESTIONNAIRE - PHQ9
5. POOR APPETITE OR OVEREATING: SEVERAL DAYS
SUM OF ALL RESPONSES TO PHQ QUESTIONS 1-9: 6

## 2025-02-24 ASSESSMENT — ANXIETY QUESTIONNAIRES
2. NOT BEING ABLE TO STOP OR CONTROL WORRYING: MORE THAN HALF THE DAYS
GAD7 TOTAL SCORE: 8
5. BEING SO RESTLESS THAT IT IS HARD TO SIT STILL: SEVERAL DAYS
1. FEELING NERVOUS, ANXIOUS, OR ON EDGE: SEVERAL DAYS
IF YOU CHECKED OFF ANY PROBLEMS ON THIS QUESTIONNAIRE, HOW DIFFICULT HAVE THESE PROBLEMS MADE IT FOR YOU TO DO YOUR WORK, TAKE CARE OF THINGS AT HOME, OR GET ALONG WITH OTHER PEOPLE: SOMEWHAT DIFFICULT
GAD7 TOTAL SCORE: 8
7. FEELING AFRAID AS IF SOMETHING AWFUL MIGHT HAPPEN: NOT AT ALL
6. BECOMING EASILY ANNOYED OR IRRITABLE: SEVERAL DAYS
3. WORRYING TOO MUCH ABOUT DIFFERENT THINGS: MORE THAN HALF THE DAYS

## 2025-02-24 NOTE — TELEPHONE ENCOUNTER
Please see [employer] Medium message dated 02/23/2025. RN responded to Patient within that encounter.    Thank you,    Laura Mclaughlin RN  [facility] Internal Medicine

## 2025-02-24 NOTE — PROGRESS NOTES
Transition Clinic - Initial Visit Progress Note    Patient  Name: Vane Galicia, : 1980    Date:  2025       Service Type:  Mental Health Initial Visit    VISIT TIME START:   VISIT TIME END:      Session Length:   TC Session Length: 45 (38-52 Minutes)    Visit #: 1    Attendees:  TC Attendees: Client alone    Service Modality:  Service Modality: Video Visit:      Provider verified identity through the following two step process.  Patient provided:  Patient  and Patient address    Telemedicine Visit: The patient's condition can be safely assessed and treated via synchronous audio and visual telemedicine encounter.      Reason for Telemedicine Visit: Patient has requested telehealth visit    Originating Site (Patient Location): Patient's home    Distant Site (Provider Location): Provider Remote Setting- Home Office    Consent:  The patient/guardian has verbally consented to: the potential risks and benefits of telemedicine (video visit) versus in person care; bill my insurance or make self-payment for services provided; and responsibility for payment of non-covered services.     Patient would like the video invitation sent by:  Text to cell phone: [phone]    Mode of Communication:  Video Conference via [employer]    Distant Location (Provider):  Off-site    As the provider I attest to compliance with applicable laws and regulations related to telemedicine.    Source of Referral:  Other      Informed Consent and Assessment Methods    This provider and patient discussed HIPAA, and the limits of confidentiality; including mandated reporting, the possibility of collaborative discussions with patient's primary care provider and the multidisciplinary team in the MH clinic during consultation.  Discussed the no show policy, and the benefits and possible unintended consequences of therapy.  Patient indicated understanding Transition Clinic services are short term, solution focused, until a  referral can be made and patient can bridge to long term therapy.  Patient verbally indicated understanding the informed consent.         Presenting Concerns/  Current Stressors:  Vane Galicia is a 44 year old identified female who was referred to the Transition Clinic by [facility]  for brief therapy, referral and bridging.  Vane Galicia reports she was recently diagnosed with ADHD.  She states she has always had attention problems along with depression and anxiety.  She immigrated from the [facility] 20 years ago.Her English is very good and no  is needed. She states she was in a medical program for Chiropractics but dropped out after 1 year due to attention, focus and concentration. 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  She sates she will need to be full time.  She is worried about the ADHD and the impact on her education plans.  She currently has medication management with [facility].  She becomes tearful sharing about her previous attempts in medical school. She states she is disappointed in herself.  She states it is very important that she is successful in nursing school and that she can finish. She denied current suicidal ideation. Patient did answer most days to q 9 on th PHQ9. She refused to answer on the [place].  She has a safety plan in her medical file.  She may be hesitant to answer yes on [employer] fearing  repercussion with education/vocational plans.  Provider and patient attempted to contact [facility] for appt.  Vane left a message for a call back.    ASSESSMENT:    Required Screenings: The following assessments were completed by patient for this visit:  PHQ9:       2/21/2023    10:53 AM 6/12/2024     2:36 PM 8/22/2024    10:10 AM 2/21/2025     8:05 AM 2/24/2025     3:55 PM   PHQ-9 SCORE   PHQ-9 Total Score [facility] [place] (Moderate depression) 20 (Severe  depression) 6 (Mild depression) 18 (Moderately severe depression)    PHQ-9 Total Score 14 20 6 18  6       Patient-reported     GAD7:       8/22/2024    10:11 AM 8/22/2024    10:36 AM 2/24/2025     3:55 PM   ELAINA-7 SCORE   Total Score 8 (mild anxiety)     Total Score 8 5 8     CAGE-AID:       2/24/2025     3:55 PM   CAGE-AID Total Score   Total Score 0     PROMIS 10-Global Health (all questions and answers displayed):       2/24/2025     3:55 PM   PROMIS 10   In general, would you say your health is: 4   In general, would you say your quality of life is: 4   In general, how would you rate your physical health? 4   In general, how would you rate your mental health, including your mood and your ability to think? 2   In general, how would you rate your satisfaction with your social activities and relationships? 3   In general, please rate how well you carry out your usual social activities and roles. (This includes activities at home, at work and in your community, and responsibilities as a parent, child, spouse, employee, friend, etc.) 3   To what extent are you able to carry out your everyday physical activities such as walking, climbing stairs, carrying groceries, or moving a chair? 4   In the past 7 days, how often have you been bothered by emotional problems such as feeling anxious, depressed, or irritable? 4   In the past 7 days, how would you rate your fatigue on average? 3   In the past 7 days, how would you rate your pain on average, where 0 means no pain, and 10 means worst imaginable pain? 2   Global Mental Health Score 11   Global Physical Health Score 15   PROMIS TOTAL - SUBSCORES 26     [place] Suicide Severity Rating Scale (Lifetime/Recent)      1/12/2023    12:52 PM 1/12/2023     7:00 PM 6/23/2024     2:54 PM 2/18/2025     7:31 PM 2/18/2025     7:32 PM 2/19/2025     1:03 AM 2/24/2025     5:03 PM   [place] Suicide Severity Rating (Lifetime/Recent)   Q1 Wish to be Dead (Lifetime)    --      Q2  Non-Specific Active Suicidal Thoughts (Lifetime)    --      Q1 Wished to be Dead (Past Month) yes  0-->no  -- 1-->yes    Q2 Suicidal Thoughts (Past Month) yes  0-->no  -- 0-->no    Q3 Suicidal Thought Method yes         Q4 Suicidal Intent without Specific Plan yes         Q5 Suicide Intent with Specific Plan no         Q6 Suicide Behavior (Lifetime) no  0-->no   1-->yes    If yes to Q6, within past 3 months?      0-->no    Level of Risk per Screen high risk  no risks indicated   moderate risk    1. Wish to be Dead (Lifetime)  Y     --   1. Wish to be Dead (Past 1 Month)  Y        2. Non-Specific Active Suicidal Thoughts (Lifetime)  Y     --   2. Non-Specific Active Suicidal Thoughts (Past 1 Month)  Y        3. Active Suicidal Ideation with any Methods (Not Plan) Without Intent to Act (Lifetime)  Y        3. Active Suicidal Ideation with any Methods (Not Plan) Without Intent to Act (Past 1 Month)  Y        4. Active Suicidal Ideation with Some Intent to Act, Without Specific Plan (Lifetime)  Y        4. Active Suicidal Ideation with Some Intent to Act, Without Specific Plan (Past 1 Month)  Y        5. Active Suicidal Ideation with Specific Plan and Intent (Lifetime)  Y        5. Active Suicidal Ideation with Specific Plan and Intent (Past 1 Month)  Y        Most Severe Ideation Rating (Lifetime)  5  --   --   Most Severe Ideation Rating (Past 1 Month)  5   --  1   Description of Most Severe Ideation (Past 1 Month)  12/29/2022 overdose        Frequency (Lifetime)  1  --      Frequency (Past 1 Month)  1   --     Duration (Lifetime)  1  --      Duration (Past 1 Month)  1   --     Controllability (Lifetime)  0  --      Controllability (Past 1 Month)  0   --     Deterrents (Lifetime)  1  --      Deterrents (Past 1 Month)  5        Reasons for Ideation (Lifetime)  1  --      Reasons for Ideation (Past 1 Month)  1   --     Actual Attempt (Lifetime)  Y  Y      Total Number of Actual Attempts (Lifetime)  1  1      Actual  Attempt Description (Lifetime)  12/29/2022 overdose  2022 - intentional overdose while on a cruise; had seizures, hospitalized briefly      Actual Attempt (Past 3 Months)  Y   --     Total Number of Actual Attempts (Past 3 Months)  1        Actual Attempt Description (Past 3 Months)  12/29/2022 overdose        Has subject engaged in non-suicidal self-injurious behavior? (Lifetime)  N  --      Has subject engaged in non-suicidal self-injurious behavior? (Past 3 Months)     Y     Interrupted Attempts (Lifetime)  Y  --      Total Number of Interrupted Attempts (Lifetime)  1        Interrupted Attempt Description (Lifetime)  12/29/2022        Interrupted Attempts (Past 3 Months)  Y   --     Total Number of Interrupted Attempts (Past 3 Months)  1        Interrupted Attempt Description (Past 3 Months)  12/29/2022 overdose        Aborted or Self-Interrupted Attempt (Lifetime)  N  --      Aborted or Self-Interrupted Attempt (Past 3 Months)     --     Preparatory Acts or Behavior (Lifetime)  N  --      Preparatory Acts or Behavior (Past 3 Months)     --     Most Recent Attempt Date  12/29/2022        Actual Lethality/Medical Damage Code (Most Recent Attempt)  2        Potential Lethality Code (Most Recent Attempt)  1        Most Lethal Attempt Date  12/29/2022        Actual Lethality/Medical Damage Code (Most Lethal Attempt)  2        Potential Lethality Code (Most Lethal Attempt)  1        Initial/First Attempt Date  12/29/2022        Actual Lethality/Medical Damage Code (Initial/First Attempt)  2        Potential Lethality Code (Initial/First Attempt)  1        Calculated C-SSRS Risk Score (Lifetime/Recent)  High Risk  Moderate Risk          Mental Status Assessment:  Appearance:   Appropriate   Eye Contact:   Good   Psychomotor Behavior: Normal   Attitude:   Cooperative   Orientation:   Person Place Time Situation  Speech     Rate / Production:  Normal/ Responsive     Volume:   Normal   Mood:    Depressed    Affect:    Appropriate  Teary  Thought Content:  Clear   Thought Form:  Coherent  Goal Directed   Insight:    Good       Safety Issues and Plan for Safety and Risk Management:     Patient denies current fears or concerns for personal safety.  Patient denies current or recent suicidal ideation or behaviors.  Patient denies current or recent homicidal ideation or behaviors.  Patient denies current or recent self injurious behavior or ideation.  Patient denies other safety concerns.  Recommended that patient call 911 or go to the local ED should there be a change in any of these risk factors  Patient reports there are no firearms in the house.     Diagnostic Criteria:  Generalized Anxiety Disorder  A. Excessive anxiety and worry about a number of events or activities (such as work or school performance).   B. The person finds it difficult to control the worry.  C. Select 3 or more symptoms (required for diagnosis). Only one item is required in children.   - Restlessness or feeling keyed up or on edge.    - Being easily fatigued.    - Difficulty concentrating or mind going blank.    - Irritability.    - Sleep disturbance (difficulty falling or staying asleep, or restless unsatisfying sleep).   D. The focus of the anxiety and worry is not confined to features of an Axis I disorder.  E. The anxiety, worry, or physical symptoms cause clinically significant distress or impairment in social, occupational, or other important areas of functioning.   F. The disturbance is not due to the direct physiological effects of a substance (e.g., a drug of abuse, a medication) or a general medical condition (e.g., hyperthyroidism) and does not occur exclusively during a Mood Disorder, a Psychotic Disorder, or a Pervasive Developmental Disorder.    Major Depressive Disorder  CRITERIA (A-C) REPRESENT A MAJOR DEPRESSIVE EPISODE - SELECT THESE CRITERIA  A) Recurrent episode(s) - symptoms have been present during the same 2-week period and  represent a change from previous functioning 5 or more symptoms (required for diagnosis)   - Depressed mood. Note: In children and adolescents, can be irritable mood.     - Diminished interest or pleasure in all, or almost all, activities.    - Significant weight loss when not dieting increase in appetite.    - Psychomotor activity Restlessness.    - Fatigue or loss of energy.    - Diminished ability to think or concentrate, or indecisiveness.   B) The symptoms cause clinically significant distress or impairment in social, occupational, or other important areas of functioning  C) The episode is not attributable to the physiological effects of a substance or to another medical condition  D) The occurence of major depressive episode is not better explained by other thought / psychotic disorders  E) There has never been a manic episode or hypomanic episode    Attention Deficit Hyperactivity Disorder  A) A persistent pattern of inattention and/or hyperactivity-impulsivity that interferes with functioning or development, as characterized by (1) Inattention and/or (2) Hyperactivity and Impulsivity  (1) Inattention: 6 or more of the following symptoms have persisted for at least 6 months to a degree that is inconsistent with developmental level and that negatively impacts directly on social and academic/occupational activities:  - Often fails to give close attention to details or makes careless mistakes in schoolwork, at work, or during other activities  - Often has difficulty sustaining attention in tasks or play activities  - Often does not follow through on instructions and fails to finish schoolwork, chores, or duties in the workplace  - Often has difficulty organizing tasks and activities  - Is often easily distractedby extraneous stimuli  - Is often forgetful in daily activities  B) Several inattentive or hyperactive-impulsive symptoms were present prior to age 12 years  C) Several inattentive or  hyperactive-impulsive symptoms are present in two or more settings  D) There is clear evidence that the symptoms interfere with, or reduce the quality of, social academic, or occupational functioning  E) The Symptoms do not occur exclusively during the course of schizophrenia or another psychotic disorder and are not better explained by another mental disorder    DSM5 Diagnoses: (Sustained by DSM5 Criteria Listed Above)  Diagnoses: Attention-Deficit/Hyperactivity Disorder  314.00 (F90.0) Predominantly inattentive presentation  296.32 (F33.1) Major Depressive Disorder, Recurrent Episode, Moderate _  300.02 (F41.1) Generalized Anxiety Disorder  Psychosocial & Contextual Factors: immigrant from the [facility] x 20 years.  Problems with educational pursuits due to ADHD.    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 History of divorce      Intervention:   Solution Focused Brief Therapy:    Brief Psychotherapy - discussed and prioritizing the most efficient treatment.    Collateral Reports Completed:  TC Collateral: [facility] electronic medical records reviewed. and No Collateral obtained.    DATA:  Interactive Complexity: No  Crisis: No    PLAN: (Homework, other):  Provider will continue Diagnostic Assessment. Initial Treatment will focus on: Attentional Problems - Patient requested assistance locating an ADHD  for support through school .  2.   Provider recommended the following referrals: [facility].      3.   Information in this assessment was obtained from the medical record and provided by patient who is a fair historian.   4.   Follow up scheduled:  patient will call TC if she decides or needs to reschedule for additional referrals        Patient was given the following to do until next session:  no assigned tasks  5.  Anticipated Discharge: Anticipated Discharge Time: < 1 Month   6. Is this the patient's last discharge: No      Procedure Code:  Psychotherapy (with patient) - 45 [CPT  21371]    [facility]    Suicide Risk and Safety Concerns were assessed for. Patient meets the following risk assessment and triage: Patient denied any current/recent/lifetime history of suicidal ideation and/or behaviors.  No safety plan indicated at this time.

## 2025-02-25 RX ORDER — CHOLECALCIFEROL (VITAMIN D3) 50 MCG
1 TABLET ORAL DAILY
Qty: 90 TABLET | Refills: 3 | Status: SHIPPED | OUTPATIENT
Start: 2025-02-25

## 2025-02-26 ENCOUNTER — TELEPHONE (OUTPATIENT)
Dept: FAMILY MEDICINE | Facility: CLINIC | Age: 45
End: 2025-02-26
Payer: COMMERCIAL

## 2025-02-26 DIAGNOSIS — E66.09 CLASS 1 OBESITY DUE TO EXCESS CALORIES WITHOUT SERIOUS COMORBIDITY WITH BODY MASS INDEX (BMI) OF 32.0 TO 32.9 IN ADULT: Primary | ICD-10-CM

## 2025-02-26 DIAGNOSIS — E66.811 CLASS 1 OBESITY DUE TO EXCESS CALORIES WITHOUT SERIOUS COMORBIDITY WITH BODY MASS INDEX (BMI) OF 32.0 TO 32.9 IN ADULT: Primary | ICD-10-CM

## 2025-02-26 DIAGNOSIS — F33.1 MODERATE EPISODE OF RECURRENT MAJOR DEPRESSIVE DISORDER (H): ICD-10-CM

## 2025-02-26 DIAGNOSIS — F90.9 ATTENTION DEFICIT HYPERACTIVITY DISORDER (ADHD), UNSPECIFIED ADHD TYPE: ICD-10-CM

## 2025-02-26 NOTE — RESULT ENCOUNTER NOTE
Ronaldo Cifuentes,    Overall, labs look good. Please continue your vitamin d supplementation which I will send to your pharmacy. Your iron levels actually look good so you can decrease the iron supplement to a couple times/week versus daily. Mildly elevated ferritin levels but sometimes these can be elevated in acute stress or as other acute so we will plan to recheck these at our follow-up.    Let me know if you have any questions or concerns,   Mireya Gonzales, DNP, APRN, CNP

## 2025-02-26 NOTE — TELEPHONE ENCOUNTER
Note: Due to record-high volumes, our turn-around time is taking longer than usual . We are currently 4  business days behind in the pools.   We are working diligently to submit all requests in a timely manner and in the order they are received. Please only flag TRUE URGENT requests as high priority to the pool at this time.   If you have questions on status of PA's,  please send a note/message in the active PA encounter and send back to the [facility] PA pool [[id number]].    If you have questions about the turn-around time or about our process, please reach out to our supervisor Kendal Odom.   Thank you!   RPPA (Retail Pharmacy Prior Authorization) team

## 2025-03-01 NOTE — TELEPHONE ENCOUNTER
"PA Initiation    Medication: CONTRAVE 8-90 MG PO TB12  Insurance Company: [employer] - Phone [phone] Fax [phone]  Pharmacy Filling the Rx: [id number] - [place] - [id number]  Filling Pharmacy Phone: [phone]  Filling Pharmacy Fax:    Start Date: 2/28/2025    CMBRIAN SAYS \"PA NOT NEEDED\" BUT PROBABLY REALLY MEANS IT'S NOT COVERED. CALL RAYA.      "

## 2025-03-04 RX ORDER — BUPROPION HYDROCHLORIDE 150 MG/1
150 TABLET ORAL EVERY MORNING
Qty: 90 TABLET | Refills: 1 | Status: SHIPPED | OUTPATIENT
Start: 2025-03-04

## 2025-03-04 RX ORDER — NALTREXONE HYDROCHLORIDE 50 MG/1
25 TABLET, FILM COATED ORAL DAILY
Qty: 30 TABLET | Refills: 1 | Status: SHIPPED | OUTPATIENT
Start: 2025-03-04

## 2025-03-04 NOTE — TELEPHONE ENCOUNTER
PRIOR AUTHORIZATION DENIED    Medication: CONTRAVE 8-90 MG PO TB12  Insurance Company: [employer] - Phone [phone] Fax [phone]  Denial Date: 3/4/2025  Denial Reason(s): PLAN EXCLUSION    Hello,    This prior authorization has been denied as a drug exclusion.    Drug exclusions cannot be appealed. This medication will not be covered by the prescription plan for any reason. The drug is not on formulary and there are no loopholes to gaining approval.    The patient is able to use [employer] or another discount card to help with the cost of the medication.    Please follow up with the patient and close the encounter.     Thank you!    Retail Pharmacy Prior Authorization Team   Phone: [phone]    Appeal Information: NA  Patient Notified: NO    Per PC with [employer] today - PA wouldn't process through [facility] website because Contrave is excluded from coverage therefore prior authorization is not allowed. There is no pathway to coverage. Provider could potentially try to prescribe med ingredients separately which may work.

## 2025-03-06 ENCOUNTER — TELEPHONE (OUTPATIENT)
Dept: GASTROENTEROLOGY | Facility: CLINIC | Age: 45
End: 2025-03-06
Payer: COMMERCIAL

## 2025-03-06 NOTE — TELEPHONE ENCOUNTER
Attempted to contact patient in order to complete pre assessment questions.     Phone picked up after a couple rings but nobody was on the line. There was no voicemail prompt to leave a message. No alternate phone number to try.    Callback communication sent via [employer].    Archana Mason RN

## 2025-03-06 NOTE — TELEPHONE ENCOUNTER
Pre visit planning completed.      Procedure details:    Patient scheduled for Colonoscopy on 3-24-25.     Arrival time: 0645. Procedure time 0730    Facility location: [facility]; [facility] [place] [address]. Check in location: [facility].     Sedation type: Conscious sedation     Pre op exam needed? No.    Indication for procedure: screening       Chart review:     Electronic implanted devices? No    Recent diagnosis of diverticulitis within the last 6 weeks? No      Medication review:    Diabetic? No    Anticoagulants? No    Weight loss medication/injectable? No GLP-1 medication per patient's medication list. Nursing to verify with pre-assessment call.    Other medication HOLDING recommendations:  Ferrous sulfate (iron supplements): HOLD 7 days before procedure.  Naltrexone PO: HOLD 3 days before procedure.       Prep for procedure:     Bowel prep recommendation: Standard Miralax.   Due to: standard bowel prep    Procedure information and instructions sent via [employer]         Raina Guidry RN  Endoscopy Procedure Pre Assessment   [phone] option 3

## 2025-03-24 ENCOUNTER — HOSPITAL ENCOUNTER (OUTPATIENT)
Facility: CLINIC | Age: 45
Discharge: HOME | End: 2025-03-24
Attending: SURGERY | Admitting: SURGERY
Payer: COMMERCIAL

## 2025-03-24 VITALS
RESPIRATION RATE: 14 BRPM | OXYGEN SATURATION: 99 % | DIASTOLIC BLOOD PRESSURE: 72 MMHG | SYSTOLIC BLOOD PRESSURE: 111 MMHG | BODY MASS INDEX: 28.92 KG/M2 | WEIGHT: 202 LBS | HEART RATE: 71 BPM | HEIGHT: 70 IN

## 2025-03-24 LAB — COLONOSCOPY: NORMAL

## 2025-03-24 PROCEDURE — G0121 COLON CA SCRN NOT HI RSK IND: HCPCS | Performed by: SURGERY

## 2025-03-24 PROCEDURE — 250N000013 HC RX MED GY IP 250 OP 250 PS 637: Performed by: SURGERY

## 2025-03-24 PROCEDURE — 250N000011 HC RX IP 250 OP 636: Performed by: SURGERY

## 2025-03-24 PROCEDURE — 45378 DIAGNOSTIC COLONOSCOPY: CPT | Performed by: SURGERY

## 2025-03-24 PROCEDURE — G0500 MOD SEDAT ENDO SERVICE >5YRS: HCPCS | Performed by: SURGERY

## 2025-03-24 PROCEDURE — 258N000003 HC RX IP 258 OP 636: Performed by: SURGERY

## 2025-03-24 RX ORDER — FENTANYL CITRATE 50 UG/ML
INJECTION, SOLUTION INTRAMUSCULAR; INTRAVENOUS PRN
Status: DISCONTINUED | OUTPATIENT
Start: 2025-03-24 | End: 2025-03-24 | Stop reason: HOSPADM

## 2025-03-24 RX ORDER — ONDANSETRON 2 MG/ML
4 INJECTION INTRAMUSCULAR; INTRAVENOUS EVERY 6 HOURS PRN
Status: CANCELLED | OUTPATIENT
Start: 2025-03-24

## 2025-03-24 RX ORDER — LIDOCAINE 40 MG/G
CREAM TOPICAL
Status: CANCELLED | OUTPATIENT
Start: 2025-03-24

## 2025-03-24 RX ORDER — FLUMAZENIL 0.1 MG/ML
0.2 INJECTION, SOLUTION INTRAVENOUS
Status: CANCELLED | OUTPATIENT
Start: 2025-03-24 | End: 2025-03-24

## 2025-03-24 RX ORDER — NALOXONE HYDROCHLORIDE 0.4 MG/ML
0.2 INJECTION, SOLUTION INTRAMUSCULAR; INTRAVENOUS; SUBCUTANEOUS
Status: CANCELLED | OUTPATIENT
Start: 2025-03-24

## 2025-03-24 RX ORDER — NALOXONE HYDROCHLORIDE 0.4 MG/ML
0.4 INJECTION, SOLUTION INTRAMUSCULAR; INTRAVENOUS; SUBCUTANEOUS
Status: CANCELLED | OUTPATIENT
Start: 2025-03-24

## 2025-03-24 RX ORDER — PROCHLORPERAZINE MALEATE 10 MG
10 TABLET ORAL EVERY 6 HOURS PRN
Status: CANCELLED | OUTPATIENT
Start: 2025-03-24

## 2025-03-24 RX ORDER — ONDANSETRON 2 MG/ML
4 INJECTION INTRAMUSCULAR; INTRAVENOUS
Status: CANCELLED | OUTPATIENT
Start: 2025-03-24

## 2025-03-24 RX ORDER — SODIUM CHLORIDE 9 MG/ML
INJECTION, SOLUTION INTRAVENOUS CONTINUOUS PRN
Status: DISCONTINUED | OUTPATIENT
Start: 2025-03-24 | End: 2025-03-24 | Stop reason: HOSPADM

## 2025-03-24 RX ORDER — SIMETHICONE 40MG/0.6ML
SUSPENSION, DROPS(FINAL DOSAGE FORM)(ML) ORAL PRN
Status: DISCONTINUED | OUTPATIENT
Start: 2025-03-24 | End: 2025-03-24 | Stop reason: HOSPADM

## 2025-03-24 RX ORDER — ONDANSETRON 4 MG/1
4 TABLET, ORALLY DISINTEGRATING ORAL EVERY 6 HOURS PRN
Status: CANCELLED | OUTPATIENT
Start: 2025-03-24

## 2025-03-24 ASSESSMENT — ACTIVITIES OF DAILY LIVING (ADL): ADLS_ACUITY_SCORE: 41

## 2025-03-24 NOTE — H&P
Pre-Endoscopy History and Physical     Vane Galicia MRN# [id number]   YOB: 1980 Age: 44 year old     Date of Procedure: 3/24/25  Primary care provider: No Ref-Primary, Physician  Type of Endoscopy: colonoscopy  Reason for Procedure: Average risk for screening   Type of Anesthesia Anticipated: Moderate Sedation    HPI:    Vane is a 44 year old female who will be undergoing the above procedure.      A history and physical has been performed. The patient's medications and allergies have been reviewed. The risks and benefits of the procedure including the risk of bleeding, perforation, and missed lesions as well as the sedation options and risks were discussed with the patient.  All questions were answered and informed consent was obtained.        Last Colonoscopy: First colonoscopy   Family History of Colorectal Cancer: No FH CRC  No Known Allergies     No current facility-administered medications for this encounter.       Medications Prior to Admission   Medication Sig Dispense Refill Last Dose/Taking    buPROPion (WELLBUTRIN XL) 150 MG 24 hr tablet Take 1 tablet (150 mg) by mouth every morning. 90 tablet 1     COLLAGEN PO        cyanocobalamin (VITAMIN B-12) 1000 MCG tablet Take 1 tablet (1,000 mcg) by mouth daily 90 tablet 3     dextroamphetamine (DEXEDRINE SPANSULE) 15 MG 24 hr capsule        escitalopram (LEXAPRO) 20 MG tablet        ferrous sulfate (FEROSUL) 325 (65 Fe) MG tablet Take 325 mg by mouth daily (with breakfast).       ferrous sulfate (FEROSUL) 325 (65 Fe) MG tablet Take 1 tablet (325 mg) by mouth daily (with breakfast) 180 tablet 1     fish oil-omega-3 fatty acids 1000 MG capsule Take 1 g by mouth daily.       fish oil-omega-3 fatty acids 1000 MG capsule Take 1 g by mouth daily.       ibuprofen (ADVIL/MOTRIN) 600 MG tablet Take 1 tablet (600 mg) by mouth every 6 hours as needed for moderate pain 60 tablet 0     minoxidil (LONITEN) 2.5 MG tablet Take 1 tablet (2.5 mg) by mouth  daily. 90 tablet 1     Multiple Vitamins-Minerals (ZINC PO) Zinc       naltrexone (DEPADE/REVIA) 50 MG tablet Take 0.5 tablets (25 mg) by mouth daily. 30 tablet 1     naltrexone-bupropion (CONTRAVE) 8-90 MG per 12 hr tablet Take 1 tablet by mouth daily. 90 tablet 0     RITALIN LA 10 MG 24 hr capsule TAKE ONE CAPSULE BY MOUTH EVERY DAY FOR 14 DAYS (Patient not taking: Reported on 2/21/2025)       VITAMIN D PO Vitamin D       vitamin D3 (CHOLECALCIFEROL) 50 mcg (2000 units) tablet Take 1 tablet (50 mcg) by mouth daily. 90 tablet 3     vitamin D3 (CHOLECALCIFEROL) 50 mcg (2000 units) tablet Take 1 tablet (50 mcg) by mouth daily. 90 tablet 3        Patient Active Problem List   Diagnosis    Annual physical exam    Mass of left breast, unspecified quadrant    Family history of malignant neoplasm of breast    Generalized anxiety disorder    Moderate episode of recurrent major depressive disorder (H)    Uterine leiomyoma, unspecified location    SOB (shortness of breath)    High priority for 2019-nCoV vaccine    Screening for HIV (human immunodeficiency virus)    Need for hepatitis C screening test    Left atrial enlargement    Iron deficiency    Chronic fatigue syndrome    Abnormal uterine bleeding    Depression    Anxiety    Suicidal ideation        Past Medical History:   Diagnosis Date    Anxiety     Depression     Fibroid uterus     Iron deficiency         Past Surgical History:   Procedure Laterality Date    ESOPHAGOSCOPY, GASTROSCOPY, DUODENOSCOPY (EGD), COMBINED N/A 1/4/2018    Procedure: COMBINED ESOPHAGOSCOPY, GASTROSCOPY, DUODENOSCOPY (EGD);  EGD foreign body;  Surgeon: Marlyn Colindres MD;  Location:  GI       Social History     Tobacco Use    Smoking status: Never    Smokeless tobacco: Never   Substance Use Topics    Alcohol use: No       Family History   Problem Relation Age of Onset    Breast Cancer Mother 55    Heart Disease Maternal Grandmother     Hypertension Maternal Grandmother      "Esophageal Cancer Paternal Grandfather         smoker    Diabetes Type 1 Daughter     Colon Cancer No family hx of          PHYSICAL EXAM:   Vital signs:      BP: 104/71 Pulse: 76   Resp: 13 SpO2: 94 % O2 Device: None (Room air)   Height: 176.5 cm (5' 9.5\") Weight: 91.6 kg (202 lb)  Estimated body mass index is 29.4 kg/m  as calculated from the following:    Height as of this encounter: 1.765 m (5' 9.5\").    Weight as of this encounter: 91.6 kg (202 lb).  Mental status - alert and oriented  RESP: lungs clear  CV: RRR  AIRWAY EXAM: Mallampatti Class IV (visualization of the hard palate only, soft palate not visable)    IMPRESSION   ASA Class 2 - Mild systemic disease      Signed Electronically by:   Shreya Reyes MD    [facility]  [address], [address]  [place] [address]  T: [phone]  F: [phone]  Pager: [phone]      For questions/paging, please contact the CRS office at [phone].       3/24/2025  8:04 AM          "

## 2025-04-21 ENCOUNTER — PATIENT OUTREACH (OUTPATIENT)
Dept: CARE COORDINATION | Facility: CLINIC | Age: 45
End: 2025-04-21
Payer: COMMERCIAL

## 2025-06-27 ENCOUNTER — RESULTS FOLLOW-UP (OUTPATIENT)
Dept: FAMILY MEDICINE | Facility: CLINIC | Age: 45
End: 2025-06-27

## 2025-06-27 ENCOUNTER — OFFICE VISIT (OUTPATIENT)
Dept: FAMILY MEDICINE | Facility: CLINIC | Age: 45
End: 2025-06-27
Payer: COMMERCIAL

## 2025-06-27 VITALS
DIASTOLIC BLOOD PRESSURE: 76 MMHG | BODY MASS INDEX: 22.71 KG/M2 | SYSTOLIC BLOOD PRESSURE: 114 MMHG | OXYGEN SATURATION: 100 % | HEIGHT: 70 IN | TEMPERATURE: 97.8 F | WEIGHT: 158.6 LBS | RESPIRATION RATE: 20 BRPM | HEART RATE: 75 BPM

## 2025-06-27 DIAGNOSIS — F33.1 MODERATE EPISODE OF RECURRENT MAJOR DEPRESSIVE DISORDER (H): ICD-10-CM

## 2025-06-27 DIAGNOSIS — E66.811 CLASS 1 OBESITY DUE TO EXCESS CALORIES WITHOUT SERIOUS COMORBIDITY WITH BODY MASS INDEX (BMI) OF 32.0 TO 32.9 IN ADULT: Primary | ICD-10-CM

## 2025-06-27 DIAGNOSIS — F41.1 GENERALIZED ANXIETY DISORDER: ICD-10-CM

## 2025-06-27 DIAGNOSIS — E66.09 CLASS 1 OBESITY DUE TO EXCESS CALORIES WITHOUT SERIOUS COMORBIDITY WITH BODY MASS INDEX (BMI) OF 32.0 TO 32.9 IN ADULT: Primary | ICD-10-CM

## 2025-06-27 DIAGNOSIS — E61.1 IRON DEFICIENCY: ICD-10-CM

## 2025-06-27 DIAGNOSIS — Z11.1 SCREENING EXAMINATION FOR PULMONARY TUBERCULOSIS: ICD-10-CM

## 2025-06-27 DIAGNOSIS — L65.8 FEMALE PATTERN HAIR LOSS: ICD-10-CM

## 2025-06-27 LAB
ANION GAP SERPL CALCULATED.3IONS-SCNC: 11 MMOL/L (ref 7–15)
BUN SERPL-MCNC: 9.4 MG/DL (ref 6–20)
CALCIUM SERPL-MCNC: 9.3 MG/DL (ref 8.8–10.4)
CHLORIDE SERPL-SCNC: 105 MMOL/L (ref 98–107)
CREAT SERPL-MCNC: 0.81 MG/DL (ref 0.51–0.95)
EGFRCR SERPLBLD CKD-EPI 2021: 90 ML/MIN/1.73M2
GLUCOSE SERPL-MCNC: 92 MG/DL (ref 70–99)
HCO3 SERPL-SCNC: 23 MMOL/L (ref 22–29)
POTASSIUM SERPL-SCNC: 4.8 MMOL/L (ref 3.4–5.3)
SODIUM SERPL-SCNC: 139 MMOL/L (ref 135–145)

## 2025-06-27 PROCEDURE — 3074F SYST BP LT 130 MM HG: CPT

## 2025-06-27 PROCEDURE — 80048 BASIC METABOLIC PNL TOTAL CA: CPT

## 2025-06-27 PROCEDURE — 36415 COLL VENOUS BLD VENIPUNCTURE: CPT

## 2025-06-27 PROCEDURE — 99214 OFFICE O/P EST MOD 30 MIN: CPT

## 2025-06-27 PROCEDURE — 1126F AMNT PAIN NOTED NONE PRSNT: CPT

## 2025-06-27 PROCEDURE — G2211 COMPLEX E/M VISIT ADD ON: HCPCS

## 2025-06-27 PROCEDURE — 86481 TB AG RESPONSE T-CELL SUSP: CPT

## 2025-06-27 PROCEDURE — 3078F DIAST BP <80 MM HG: CPT

## 2025-06-27 ASSESSMENT — PAIN SCALES - GENERAL: PAINLEVEL_OUTOF10: NO PAIN (0)

## 2025-06-27 NOTE — PROGRESS NOTES
"  Assessment & Plan     Class 1 obesity due to excess calories without serious comorbidity with body mass index (BMI) of 32.0 to 32.9 in adult  Continues to have success in weight loss with current regimen of Wellbutrin and Naltrexone. She is tolerating medication well without SE and is pleased with the decrease in cravings. She continues to work on cutting out gluten and other \"junk\" foods and exercises most days. Total weight loss has been ~60#. Wishes to continue losing weight, however did discuss risks with rapid weight loss. Provided reassurance on healthy BMI and to focus on ensuring adequate protein intake (80 - 100 g/day, especially with exercise), making sure she is eating a well-balanced diet, adequate hydration,  and strength training 2-3x/week. Will follow-up in 2-3 months to check on progress.  - Basic metabolic panel; Future    Moderate episode of recurrent major depressive disorder (H)  Generalized anxiety disorder  Feels mood is doing better since adding Wellbutrin. Continue Lexapro.    Iron deficiency  Recent labs with improvement, can discontinue iron supplementation for now and continue to eat diet with iron rich foods.    Female pattern hair loss  Feels hair loss has slowed and has even been seeing some hair re-growth since adding spironolactone to minoxidil. Is tolerating well so can continue. Check labs to ensure electrolyte stability.   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 Filled out and provide immunization record per pt request.  - Quantiferon TB Gold Plus; Future            Follow-up     3 months  Subjective   Vane is a 45 year old, presenting for the following health issues:  Follow Up (Patient is here to follow up with provider and school forms.)        6/27/2025     8:46 AM   Additional Questions   Roomed by Julien SAVAGE MA   Accompanied by Self         6/27/2025   Forms   Any forms needing to be " "completed Yes     Here for follow-up  Feels hair loss is progressing nicely - happy with results  Also happy with weight loss, would like to continue to progress. Does have one day where she fasts completely because she has done some reading about fasting and its potential health benefits.   Continues to cut out gluten. Feeling much better with her cravings, really only craving fruits now which she is happy about  Continuing to work out most days. 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  She exercises with enough effort to increase her heart rate 6 days per week. She is missing 1 dose(s) of medications per week.  She is not taking prescribed medications regularly due to remembering to take.                  Review of Systems  Constitutional, HEENT, cardiovascular, pulmonary, gi and gu systems are negative, except as otherwise noted.      Objective    /76 (BP Location: Right arm, Patient Position: Sitting, Cuff Size: Adult Regular)   Pulse 75   Temp 97.8  F (36.6  C) (Temporal)   Resp 20   Ht 1.765 m (5' 9.5\")   Wt 71.9 kg (158 lb 9.6 oz)   LMP 06/06/2025   SpO2 100%   BMI 23.09 kg/m    Body mass index is 23.09 kg/m .  Physical Exam  Vitals reviewed.   Constitutional:       Appearance: Normal appearance.   HENT:      Head: Normocephalic.   Eyes:      Pupils: Pupils are equal, round, and reactive to light.   Cardiovascular:      Rate and Rhythm: Normal rate.   Pulmonary:      Effort: Pulmonary effort is normal. No respiratory distress.   Musculoskeletal:         General: Normal range of motion.   Skin:     General: Skin is warm.   Neurological:      Mental Status: She is alert and oriented to " person, place, and time.   Psychiatric:         Mood and Affect: Mood normal.         Behavior: Behavior normal.         Thought Content: Thought content normal.         Judgment: Judgment normal.                    Signed Electronically by: Mireya Gonzales, MAMTA, APRN, CNP

## 2025-06-29 LAB
GAMMA INTERFERON BACKGROUND BLD IA-ACNC: 0.04 IU/ML
M TB IFN-G BLD-IMP: NEGATIVE
M TB IFN-G CD4+ BCKGRND COR BLD-ACNC: 9.96 IU/ML
MITOGEN IGNF BCKGRD COR BLD-ACNC: 0.19 IU/ML
MITOGEN IGNF BCKGRD COR BLD-ACNC: 0.34 IU/ML
QUANTIFERON MITOGEN: 10 IU/ML
QUANTIFERON NIL TUBE: 0.04 IU/ML
QUANTIFERON TB1 TUBE: 0.23 IU/ML
QUANTIFERON TB2 TUBE: 0.38

## 2025-06-30 SDOH — HEALTH STABILITY: PHYSICAL HEALTH: ON AVERAGE, HOW MANY DAYS PER WEEK DO YOU ENGAGE IN MODERATE TO STRENUOUS EXERCISE (LIKE A BRISK WALK)?: 6 DAYS

## 2025-06-30 SDOH — HEALTH STABILITY: PHYSICAL HEALTH: ON AVERAGE, HOW MANY MINUTES DO YOU ENGAGE IN EXERCISE AT THIS LEVEL?: 60 MIN

## 2025-06-30 ASSESSMENT — SOCIAL DETERMINANTS OF HEALTH (SDOH): HOW OFTEN DO YOU GET TOGETHER WITH FRIENDS OR RELATIVES?: ONCE A WEEK

## 2025-07-01 ENCOUNTER — OFFICE VISIT (OUTPATIENT)
Dept: FAMILY MEDICINE | Facility: CLINIC | Age: 45
End: 2025-07-01
Payer: COMMERCIAL

## 2025-07-01 VITALS
BODY MASS INDEX: 22.45 KG/M2 | TEMPERATURE: 97.7 F | HEART RATE: 72 BPM | OXYGEN SATURATION: 99 % | SYSTOLIC BLOOD PRESSURE: 103 MMHG | WEIGHT: 156.8 LBS | RESPIRATION RATE: 18 BRPM | DIASTOLIC BLOOD PRESSURE: 66 MMHG | HEIGHT: 70 IN

## 2025-07-01 DIAGNOSIS — Z12.4 CERVICAL CANCER SCREENING: Primary | ICD-10-CM

## 2025-07-01 DIAGNOSIS — E61.1 IRON DEFICIENCY: ICD-10-CM

## 2025-07-01 DIAGNOSIS — F33.1 MODERATE EPISODE OF RECURRENT MAJOR DEPRESSIVE DISORDER (H): ICD-10-CM

## 2025-07-01 DIAGNOSIS — D25.9 UTERINE LEIOMYOMA, UNSPECIFIED LOCATION: ICD-10-CM

## 2025-07-01 DIAGNOSIS — Z13.220 ENCOUNTER FOR LIPID SCREENING FOR CARDIOVASCULAR DISEASE: ICD-10-CM

## 2025-07-01 DIAGNOSIS — Z13.6 ENCOUNTER FOR LIPID SCREENING FOR CARDIOVASCULAR DISEASE: ICD-10-CM

## 2025-07-01 PROCEDURE — 87624 HPV HI-RISK TYP POOLED RSLT: CPT

## 2025-07-01 ASSESSMENT — PATIENT HEALTH QUESTIONNAIRE - PHQ9: SUM OF ALL RESPONSES TO PHQ QUESTIONS 1-9: 3

## 2025-07-01 ASSESSMENT — PAIN SCALES - GENERAL: PAINLEVEL_OUTOF10: NO PAIN (0)

## 2025-07-01 NOTE — PROGRESS NOTES
Preventive Care Visit  [facility] SHAN Gonzales DNP, Geriatric Medicine  Jul 1, 2025  {Provider  Link to SmartSet :032907}    {PROVIDER CHARTING PREFERENCE:154886}    Melia Cifuentes is a 45 year old, presenting for the following:  Physical (Patient is here for annual preventative visit.  )        7/1/2025    10:06 AM   Additional Questions   Roomed by Julien SAVAGE MA   Accompanied by Self          HPI           Advance Care Planning  {The storyboard will display whether the patient has ACP docs on file. Hover over the Code section in the storyboard to access the ACP documents. :252995}  Discussed advance care planning with patient; however, patient declined at this time.        6/30/2025   General Health   How would you rate your overall physical health? (!) FAIR   Feel stress (tense, anxious, or unable to sleep) To some extent   (!) STRESS CONCERN      6/30/2025   Nutrition   Three or more servings of calcium each day? Yes   Diet: Gluten-free/reduced   How many servings of fruit and vegetables per day? 4 or more   How many sweetened beverages each day? 0-1         6/30/2025   Exercise   Days per week of moderate/strenous exercise 6 days   Average minutes spent exercising at this level 60 min         6/30/2025   Social Factors   Frequency of gathering with friends or relatives Once a week   Worry food won't last until get money to buy more No   Food not last or not have enough money for food? No   Do you have housing? (Housing is defined as stable permanent housing and does not include staying outside in a car, in a tent, in an abandoned building, in an overnight shelter, or couch-surfing.) Yes   Are you worried about losing your housing? Yes   Lack of transportation? No   Unable to get utilities (heat,electricity)? No   Want help with housing or utility concern? No   (!) HOUSING CONCERN PRESENT      6/30/2025   Dental   Dentist two times every year? (!) NO       Today's PHQ-9 Score:        7/1/2025    10:11 AM   PHQ-9 SCORE   PHQ-9 Total Score 3         6/30/2025   Substance Use   Alcohol more than 3/day or more than 7/wk Not Applicable   Do you use any other substances recreationally? No     Social History     Tobacco Use    Smoking status: Never    Smokeless tobacco: Never   Vaping Use    Vaping status: Never Used   Substance Use Topics    Alcohol use: No    Drug use: Never     {Provider  If there are gaps in the social history shown above, please follow the link to update and then refresh the note Link to Social and Substance History :980134}      7/8/2024   LAST FHS-7 RESULTS   1st degree relative breast or ovarian cancer Yes   Any relative bilateral breast cancer No   Any male have breast cancer No   Any ONE woman have BOTH breast AND ovarian cancer No   Any woman with breast cancer before 50yrs No   2 or more relatives with breast AND/OR ovarian cancer No   2 or more relatives with breast AND/OR bowel cancer No     {If any of the questions to the FHS7 are answered yes, consider referral for genetic counseling.    Additional indications for genetic referral include personal history of breast or ovarian cancer, genetic mutation in 1st degree relative which increases risk of breast cancer including BRCA1, BRCA2, HELGA, PALB 2, TP53, CHEK2, PTEN, CDH1, STK11 (per ACS) and/or 1st degree relative with history of pancreatic or high-risk prostate cancer (per NCCN):825983}   Mammogram Screening - Mammogram every 1-2 years updated in Health Maintenance based on mutual decision making        6/30/2025   STI Screening   New sexual partner(s) since last STI/HIV test? No     History of abnormal Pap smear: YES - reflected in Problem List and Health Maintenance accordingly       ASCVD Risk   The 10-year ASCVD risk score (Gil SANCHEZ, et al., 2019) is: 0.5%    Values used to calculate the score:      Age: 45 years      Sex: Female      Is Non- : No      Diabetic: No      Tobacco  "smoker: No      Systolic Blood Pressure: 103 mmHg      Is BP treated: No      HDL Cholesterol: 46 mg/dL      Total Cholesterol: 146 mg/dL        6/30/2025   Contraception/Family Planning   Questions about contraception or family planning No   What are your periods like? Regular     {Provider  REQUIRED FOR AWV Use the storyboard to review patient history, after sections have been marked as reviewed, refresh note to capture documentation:[id number]}   Reviewed and updated as needed this visit by Provider      Problems               Past Medical History:   Diagnosis Date    Anxiety     Depression     Depressive disorder     Fibroid uterus     Iron deficiency      Past Surgical History:   Procedure Laterality Date    COLONOSCOPY N/A 3/24/2025    Procedure: Colonoscopy;  Surgeon: Shreya Reyes MD;  Location:  GI    ESOPHAGOSCOPY, GASTROSCOPY, DUODENOSCOPY (EGD), COMBINED N/A 1/4/2018    Procedure: COMBINED ESOPHAGOSCOPY, GASTROSCOPY, DUODENOSCOPY (EGD);  EGD foreign body;  Surgeon: Marlyn Colindres MD;  Location:  GI     Lab work is in process  Labs reviewed in EPIC  BP Readings from Last 3 Encounters:   07/01/25 103/66   06/27/25 114/76   05/16/25 124/77    Wt Readings from Last 3 Encounters:   07/01/25 71.1 kg (156 lb 12.8 oz)   06/27/25 71.9 kg (158 lb 9.6 oz)   05/16/25 80 kg (176 lb 4.8 oz)                      Review of Systems  Constitutional, HEENT, cardiovascular, pulmonary, gi and gu systems are negative, except as otherwise noted.     Objective    Exam  /66 (BP Location: Left arm, Patient Position: Sitting, Cuff Size: Adult Regular)   Pulse 72   Temp 97.7  F (36.5  C) (Temporal)   Resp 18   Ht 1.765 m (5' 9.5\")   Wt 71.1 kg (156 lb 12.8 oz)   LMP 06/06/2025 (Exact Date)   SpO2 99%   BMI 22.82 kg/m     Estimated body mass index is 22.82 kg/m  as calculated from the following:    Height as of this encounter: 1.765 m (5' 9.5\").    Weight as of this encounter: 71.1 kg (156 lb " 12.8 oz).    Physical Exam          Signed Electronically by: Mireya Gonzales, DNP, APRN, CNP

## 2025-07-02 LAB
HPV HR 12 DNA CVX QL NAA+PROBE: NEGATIVE
HPV16 DNA CVX QL NAA+PROBE: NEGATIVE
HPV18 DNA CVX QL NAA+PROBE: NEGATIVE
HUMAN PAPILLOMA VIRUS FINAL DIAGNOSIS: NORMAL

## 2025-07-03 ENCOUNTER — RESULTS FOLLOW-UP (OUTPATIENT)
Dept: OBGYN | Facility: CLINIC | Age: 45
End: 2025-07-03

## 2025-07-07 LAB
BKR AP ASSOCIATED HPV REPORT: NORMAL
BKR LAB AP GYN ADEQUACY: NORMAL
BKR LAB AP GYN INTERPRETATION: NORMAL
BKR LAB AP PREVIOUS ABNORMAL: NORMAL
PATH REPORT.COMMENTS IMP SPEC: NORMAL
PATH REPORT.COMMENTS IMP SPEC: NORMAL
PATH REPORT.RELEVANT HX SPEC: NORMAL

## 2025-07-17 ENCOUNTER — PATIENT OUTREACH (OUTPATIENT)
Dept: CARE COORDINATION | Facility: CLINIC | Age: 45
End: 2025-07-17
Payer: COMMERCIAL

## 2025-08-05 ENCOUNTER — MYC REFILL (OUTPATIENT)
Dept: FAMILY MEDICINE | Facility: CLINIC | Age: 45
End: 2025-08-05
Payer: COMMERCIAL

## 2025-08-05 DIAGNOSIS — L65.8 FEMALE PATTERN HAIR LOSS: ICD-10-CM

## 2025-08-05 RX ORDER — MINOXIDIL 2.5 MG/1
2.5 TABLET ORAL DAILY
Qty: 90 TABLET | Refills: 1 | Status: SHIPPED | OUTPATIENT
Start: 2025-08-05

## 2025-08-09 ENCOUNTER — MYC REFILL (OUTPATIENT)
Dept: FAMILY MEDICINE | Facility: CLINIC | Age: 45
End: 2025-08-09
Payer: COMMERCIAL

## 2025-08-09 DIAGNOSIS — E66.811 CLASS 1 OBESITY DUE TO EXCESS CALORIES WITHOUT SERIOUS COMORBIDITY WITH BODY MASS INDEX (BMI) OF 32.0 TO 32.9 IN ADULT: ICD-10-CM

## 2025-08-09 DIAGNOSIS — E66.09 CLASS 1 OBESITY DUE TO EXCESS CALORIES WITHOUT SERIOUS COMORBIDITY WITH BODY MASS INDEX (BMI) OF 32.0 TO 32.9 IN ADULT: ICD-10-CM

## 2025-08-09 DIAGNOSIS — F90.9 ATTENTION DEFICIT HYPERACTIVITY DISORDER (ADHD), UNSPECIFIED ADHD TYPE: ICD-10-CM

## 2025-08-09 DIAGNOSIS — F33.1 MODERATE EPISODE OF RECURRENT MAJOR DEPRESSIVE DISORDER (H): ICD-10-CM

## 2025-08-11 RX ORDER — BUPROPION HYDROCHLORIDE 150 MG/1
150 TABLET ORAL EVERY MORNING
Qty: 90 TABLET | Refills: 3 | Status: SHIPPED | OUTPATIENT
Start: 2025-08-11

## 2025-08-11 RX ORDER — NALTREXONE HYDROCHLORIDE 50 MG/1
50 TABLET, FILM COATED ORAL DAILY
Qty: 90 TABLET | Refills: 3 | Status: SHIPPED | OUTPATIENT
Start: 2025-08-11

## 2025-08-14 DIAGNOSIS — L65.8 FEMALE PATTERN HAIR LOSS: ICD-10-CM

## 2025-08-14 RX ORDER — SPIRONOLACTONE 25 MG/1
25 TABLET ORAL DAILY
Qty: 90 TABLET | Refills: 0 | Status: SHIPPED | OUTPATIENT
Start: 2025-08-14

## 2025-08-26 DIAGNOSIS — E53.8 VITAMIN B12 DEFICIENCY (NON ANEMIC): ICD-10-CM

## 2025-08-26 RX ORDER — LANOLIN ALCOHOL/MO/W.PET/CERES
1000 CREAM (GRAM) TOPICAL DAILY
Qty: 90 TABLET | Refills: 2 | Status: SHIPPED | OUTPATIENT
Start: 2025-08-26

## (undated) RX ORDER — FENTANYL CITRATE 50 UG/ML
INJECTION, SOLUTION INTRAMUSCULAR; INTRAVENOUS
Status: DISPENSED
Start: 2025-03-24